# Patient Record
Sex: FEMALE | Race: WHITE | NOT HISPANIC OR LATINO | Employment: FULL TIME | URBAN - METROPOLITAN AREA
[De-identification: names, ages, dates, MRNs, and addresses within clinical notes are randomized per-mention and may not be internally consistent; named-entity substitution may affect disease eponyms.]

---

## 2021-11-13 ENCOUNTER — HOSPITAL ENCOUNTER (EMERGENCY)
Facility: MEDICAL CENTER | Age: 46
End: 2021-11-13
Attending: EMERGENCY MEDICINE
Payer: COMMERCIAL

## 2021-11-13 ENCOUNTER — APPOINTMENT (OUTPATIENT)
Dept: RADIOLOGY | Facility: MEDICAL CENTER | Age: 46
End: 2021-11-13
Attending: EMERGENCY MEDICINE
Payer: COMMERCIAL

## 2021-11-13 VITALS
HEART RATE: 63 BPM | DIASTOLIC BLOOD PRESSURE: 91 MMHG | WEIGHT: 213.63 LBS | RESPIRATION RATE: 16 BRPM | OXYGEN SATURATION: 95 % | BODY MASS INDEX: 34.33 KG/M2 | SYSTOLIC BLOOD PRESSURE: 169 MMHG | HEIGHT: 66 IN | TEMPERATURE: 97.2 F

## 2021-11-13 DIAGNOSIS — R20.0 LEFT FACIAL NUMBNESS: ICD-10-CM

## 2021-11-13 DIAGNOSIS — G43.809 OTHER MIGRAINE WITHOUT STATUS MIGRAINOSUS, NOT INTRACTABLE: ICD-10-CM

## 2021-11-13 LAB
ALBUMIN SERPL BCP-MCNC: 4.5 G/DL (ref 3.2–4.9)
ALBUMIN/GLOB SERPL: 1.5 G/DL
ALP SERPL-CCNC: 100 U/L (ref 30–99)
ALT SERPL-CCNC: 81 U/L (ref 2–50)
ANION GAP SERPL CALC-SCNC: 10 MMOL/L (ref 7–16)
AST SERPL-CCNC: 38 U/L (ref 12–45)
BASOPHILS # BLD AUTO: 0.7 % (ref 0–1.8)
BASOPHILS # BLD: 0.03 K/UL (ref 0–0.12)
BILIRUB SERPL-MCNC: 0.4 MG/DL (ref 0.1–1.5)
BUN SERPL-MCNC: 15 MG/DL (ref 8–22)
CALCIUM SERPL-MCNC: 9.8 MG/DL (ref 8.5–10.5)
CHLORIDE SERPL-SCNC: 107 MMOL/L (ref 96–112)
CO2 SERPL-SCNC: 26 MMOL/L (ref 20–33)
CREAT SERPL-MCNC: 0.67 MG/DL (ref 0.5–1.4)
EOSINOPHIL # BLD AUTO: 0.07 K/UL (ref 0–0.51)
EOSINOPHIL NFR BLD: 1.6 % (ref 0–6.9)
ERYTHROCYTE [DISTWIDTH] IN BLOOD BY AUTOMATED COUNT: 41.4 FL (ref 35.9–50)
GLOBULIN SER CALC-MCNC: 3 G/DL (ref 1.9–3.5)
GLUCOSE SERPL-MCNC: 78 MG/DL (ref 65–99)
HCT VFR BLD AUTO: 43.5 % (ref 37–47)
HGB BLD-MCNC: 14.4 G/DL (ref 12–16)
IMM GRANULOCYTES # BLD AUTO: 0.01 K/UL (ref 0–0.11)
IMM GRANULOCYTES NFR BLD AUTO: 0.2 % (ref 0–0.9)
LYMPHOCYTES # BLD AUTO: 1.76 K/UL (ref 1–4.8)
LYMPHOCYTES NFR BLD: 41.2 % (ref 22–41)
MCH RBC QN AUTO: 28.9 PG (ref 27–33)
MCHC RBC AUTO-ENTMCNC: 33.1 G/DL (ref 33.6–35)
MCV RBC AUTO: 87.3 FL (ref 81.4–97.8)
MONOCYTES # BLD AUTO: 0.35 K/UL (ref 0–0.85)
MONOCYTES NFR BLD AUTO: 8.2 % (ref 0–13.4)
NEUTROPHILS # BLD AUTO: 2.05 K/UL (ref 2–7.15)
NEUTROPHILS NFR BLD: 48.1 % (ref 44–72)
NRBC # BLD AUTO: 0 K/UL
NRBC BLD-RTO: 0 /100 WBC
PLATELET # BLD AUTO: 234 K/UL (ref 164–446)
PMV BLD AUTO: 8.7 FL (ref 9–12.9)
POTASSIUM SERPL-SCNC: 4.2 MMOL/L (ref 3.6–5.5)
PROT SERPL-MCNC: 7.5 G/DL (ref 6–8.2)
RBC # BLD AUTO: 4.98 M/UL (ref 4.2–5.4)
SODIUM SERPL-SCNC: 143 MMOL/L (ref 135–145)
WBC # BLD AUTO: 4.3 K/UL (ref 4.8–10.8)

## 2021-11-13 PROCEDURE — A9270 NON-COVERED ITEM OR SERVICE: HCPCS | Performed by: EMERGENCY MEDICINE

## 2021-11-13 PROCEDURE — 36415 COLL VENOUS BLD VENIPUNCTURE: CPT

## 2021-11-13 PROCEDURE — 700102 HCHG RX REV CODE 250 W/ 637 OVERRIDE(OP): Performed by: EMERGENCY MEDICINE

## 2021-11-13 PROCEDURE — 80053 COMPREHEN METABOLIC PANEL: CPT

## 2021-11-13 PROCEDURE — 99284 EMERGENCY DEPT VISIT MOD MDM: CPT

## 2021-11-13 PROCEDURE — 85025 COMPLETE CBC W/AUTO DIFF WBC: CPT

## 2021-11-13 PROCEDURE — 70551 MRI BRAIN STEM W/O DYE: CPT

## 2021-11-13 RX ORDER — TRAMADOL HYDROCHLORIDE 50 MG/1
50 TABLET ORAL EVERY 8 HOURS PRN
COMMUNITY

## 2021-11-13 RX ORDER — PROCHLORPERAZINE MALEATE 10 MG
10 TABLET ORAL ONCE
Status: COMPLETED | OUTPATIENT
Start: 2021-11-13 | End: 2021-11-13

## 2021-11-13 RX ORDER — LORAZEPAM 1 MG/1
1 TABLET ORAL ONCE
Status: COMPLETED | OUTPATIENT
Start: 2021-11-13 | End: 2021-11-13

## 2021-11-13 RX ORDER — SUMATRIPTAN 50 MG/1
50 TABLET, FILM COATED ORAL
COMMUNITY

## 2021-11-13 RX ORDER — DIPHENHYDRAMINE HCL 25 MG
25 TABLET ORAL ONCE
Status: COMPLETED | OUTPATIENT
Start: 2021-11-13 | End: 2021-11-13

## 2021-11-13 RX ADMIN — PROCHLORPERAZINE MALEATE 10 MG: 10 TABLET ORAL at 12:55

## 2021-11-13 RX ADMIN — DIPHENHYDRAMINE HYDROCHLORIDE 25 MG: 25 TABLET ORAL at 12:54

## 2021-11-13 RX ADMIN — LORAZEPAM 1 MG: 1 TABLET ORAL at 14:22

## 2021-11-13 ASSESSMENT — ENCOUNTER SYMPTOMS
BLURRED VISION: 1
FEVER: 0
SENSORY CHANGE: 1
NAUSEA: 0
WEAKNESS: 0
VOMITING: 0
TINGLING: 1
FALLS: 0
CHILLS: 0
NECK PAIN: 0
HEADACHES: 1

## 2021-11-13 NOTE — ED PROVIDER NOTES
ED Provider Note    Scribed for Floyd Avalos M.D. by Brandie English. 11/13/2021, 12:13 PM.    Primary care provider: PCP, Pt states none  Means of arrival: Walk in  History obtained from: Patient  History limited by: None    CHIEF COMPLAINT  Chief Complaint   Patient presents with   • Headache   • Tingling       HPI  Marlana Marie Behm is a 46 y.o. female, with a history of migraines, who presents to the Emergency Department for left sided headache. Onset was this morning upon waking up. She notes she felt at her baseline yesterday. She describes the left side of her face and head feels numb/tingling in quality, similar to a visit after a dentist appointment. Patient also states her daughter noted her left eyebrow was drooping this morning. She denies any proceeding trauma, injuries, or falls. She is concerned because her migraines in the past are usually right sided. However, her headache today is similar in quality to her previous right sided headaches. Patient has taken Excedrin and Imitrex this morning without any relief. She has taken Tramadol this morning as well with some mild relief. She denies any associated nausea, vomiting, fever, chills, neck trauma, extremity numbness, tingling, or weakness. No alleviating or exacerbating factors were identified.  Patient denies smoking cigarettes or any alcohol use. She is from [place] and visiting [place] for the weekend.  Denies any neck trauma or neck pain.  No chiropractic manipulation.  No other numbness tingling or weakness.  No fevers or chills or chest pain.    REVIEW OF SYSTEMS  Review of Systems   Constitutional: Negative for chills and fever.   Eyes: Positive for blurred vision.   Gastrointestinal: Negative for nausea and vomiting.   Musculoskeletal: Negative for falls and neck pain.   Neurological: Positive for tingling (left face, no extremity tingling), sensory change (left facial numbness) and headaches. Negative for weakness.   All other systems  "reviewed and are negative.    PAST MEDICAL HISTORY   has a past medical history of Migraines.    SURGICAL HISTORY   has a past surgical history that includes hysterectomy, total abdominal.    SOCIAL HISTORY  Social History     Tobacco Use   • Smoking status: Never Smoker   • Smokeless tobacco: None    Substance Use Topics   • Alcohol use: Not Currently   • Drug use: Not Currently      Social History     Substance and Sexual Activity   Drug Use Not Currently       FAMILY HISTORY  History reviewed. No pertinent family history.    CURRENT MEDICATIONS  Home Medications     Reviewed by Julia Mckeon R.N. (Registered Nurse) on 11/13/21 at 1143  Med List Status: Partial   Medication Last Dose Status   SUMAtriptan (IMITREX) 50 MG Tab  Active   traMADol (ULTRAM) 50 MG Tab  Active                ALLERGIES  No Known Allergies    PHYSICAL EXAM  VITAL SIGNS: BP (!) 188/111   Pulse 62   Temp 36.1 °C (96.9 °F) (Temporal)   Resp 12   Ht 1.676 m (5' 6\")   Wt 96.9 kg (213 lb 10 oz)   SpO2 98%   BMI 34.48 kg/m²   Vitals reviewed.  Constitutional: Well developed, Well nourished, No acute distress, Non-toxic appearance.   HENT: Normocephalic, Atraumatic, Bilateral external ears normal, Oropharynx moist, No oral exudates, Nose normal.   Eyes: PERRL, EOMI, Conjunctiva normal, No discharge.   Neck: Normal range of motion, No tenderness, Supple, No stridor.   Cardiovascular: Normal heart rate, Normal rhythm, No murmurs, No rubs, No gallops.   Thorax & Lungs: Normal breath sounds, No respiratory distress, No wheezing, No chest tenderness.   Abdomen: Bowel sounds normal, Soft, No tenderness  Skin: Warm, Dry, No erythema, No rash.   Back: No tenderness, No CVA tenderness.   Musculoskeletal: Good range of motion in all major joints. No edema. No tenderness to palpation or major deformities noted.   Neurologic: Alert, Normal motor function, Subjective left sided facial numbness. No focal deficits noted.  Cranial nerves II through XII " are intact.  She has subjective numbness on left side but no objective numbness she can actually feel.  There is no drooping of the eyebrow or of the face.  No pronator drift.  Normal strength sensation both upper extremities normal finger-to-nose.  Psychiatric: Affect normal    LABS  Results for orders placed or performed during the hospital encounter of 11/13/21   CBC WITH DIFFERENTIAL   Result Value Ref Range    WBC 4.3 (L) 4.8 - 10.8 K/uL    RBC 4.98 4.20 - 5.40 M/uL    Hemoglobin 14.4 12.0 - 16.0 g/dL    Hematocrit 43.5 37.0 - 47.0 %    MCV 87.3 81.4 - 97.8 fL    MCH 28.9 27.0 - 33.0 pg    MCHC 33.1 (L) 33.6 - 35.0 g/dL    RDW 41.4 35.9 - 50.0 fL    Platelet Count 234 164 - 446 K/uL    MPV 8.7 (L) 9.0 - 12.9 fL    Neutrophils-Polys 48.10 44.00 - 72.00 %    Lymphocytes 41.20 (H) 22.00 - 41.00 %    Monocytes 8.20 0.00 - 13.40 %    Eosinophils 1.60 0.00 - 6.90 %    Basophils 0.70 0.00 - 1.80 %    Immature Granulocytes 0.20 0.00 - 0.90 %    Nucleated RBC 0.00 /100 WBC    Neutrophils (Absolute) 2.05 2.00 - 7.15 K/uL    Lymphs (Absolute) 1.76 1.00 - 4.80 K/uL    Monos (Absolute) 0.35 0.00 - 0.85 K/uL    Eos (Absolute) 0.07 0.00 - 0.51 K/uL    Baso (Absolute) 0.03 0.00 - 0.12 K/uL    Immature Granulocytes (abs) 0.01 0.00 - 0.11 K/uL    NRBC (Absolute) 0.00 K/uL   COMP METABOLIC PANEL   Result Value Ref Range    Sodium 143 135 - 145 mmol/L    Potassium 4.2 3.6 - 5.5 mmol/L    Chloride 107 96 - 112 mmol/L    Co2 26 20 - 33 mmol/L    Anion Gap 10.0 7.0 - 16.0    Glucose 78 65 - 99 mg/dL    Bun 15 8 - 22 mg/dL    Creatinine 0.67 0.50 - 1.40 mg/dL    Calcium 9.8 8.5 - 10.5 mg/dL    AST(SGOT) 38 12 - 45 U/L    ALT(SGPT) 81 (H) 2 - 50 U/L    Alkaline Phosphatase 100 (H) 30 - 99 U/L    Total Bilirubin 0.4 0.1 - 1.5 mg/dL    Albumin 4.5 3.2 - 4.9 g/dL    Total Protein 7.5 6.0 - 8.2 g/dL    Globulin 3.0 1.9 - 3.5 g/dL    A-G Ratio 1.5 g/dL   ESTIMATED GFR   Result Value Ref Range    GFR If African American >60 >60 mL/min/1.73 m  2    GFR If Non African American >60 >60 mL/min/1.73 m 2        All labs reviewed by me.    RADIOLOGY  MR-BRAIN-W/O   Final Result      1.  MRI OF THE BRAIN WITHOUT CONTRAST WITHIN NORMAL LIMITS. NO EVIDENCE OF ACUTE INFARCTION, HEMORRHAGE, OR MASS LESION. NO EVIDENCE OF DEMYELINATING DISEASE.   2.  CONCERNING THE GIVEN HISTORY OF LEFT-SIDED FACIAL DROOP, THIS IMAGING STUDY DOES NOT EXCLUDE BELL'S PALSY. IF FURTHER IMAGING IS REQUIRED, A THIN SECTION GADOLINIUM CONTRAST ENHANCED IAC PROTOCOL WOULD BE BEST SUITED FOR IDENTIFYING BELL'S PALSY.        The radiologist's interpretation of all radiological studies have been reviewed by me.    COURSE & MEDICAL DECISION MAKING  Pertinent Labs & Imaging studies reviewed. (See chart for details)    12:13 PM Patient seen and examined at bedside. The patient presents with headache, and the differential diagnosis includes but is not limited to Bell's palsy, complicated migraine, stroke. Discussed plan of care with the patient. I informed her I will speak with neurology. She will be treated with medications for her symptoms. She is understanding and agreeable to plan. Patient will be treated with Benadryl 25 mg tablet, Compazine 10 mg tablet for her symptoms.      12:26 PM - Paged neurology.     12:27 PM - I discussed the patient's case and the above findings with Dr. Rodriguez who felt this was likely a complex migraine.  Cannot exclude stroke without an MRI.  Bell's palsy remains a differential diagnosis but she does not have symptoms other than subjective numbness.  Dr. Rodriguez (neurology)  recommended an MRI brain and to see if her blood pressure improves with medications.Ordered for MR Brain without to evaluate.  Differential diagnosis includes stroke, Bell's palsy, complicated migraine.    1:46 PM - Patient will be treated with Ativan 1 mg for her symptoms.  For anxiolysis for the MRI.    4:22 PM - Patient was reevaluated at bedside. She is resting comfortably in bed feeling  mildly improved. Discussed MRI results with the patient and informed them that they were reassuring. I will reevaluate her blood pressure at a later time.      Patient's blood pressures been intermittently high.  Is come down to 150/90.  Her headache was initially 7/10.  Repeat assessment is down about 2 out of 10 she feels much better.  She has left-sided facial numbness.  This could be an early Bell's.  At this point or not and I treat her empirically.  She will return if she develops worsening symptoms, weakness, droopy face, inability to raise her eyebrow or numbness.  She also return for stroke symptoms.  I have considered other pathology.  Doubt was a cervical dissection without any trauma or neck pain and no stroke.  He does not have a Rebecca syndrome.  Her pupils are reactive.  The patient had intermittent high blood pressure I do not think he is having hypertensive urgency or emergency.  Patient does not have a hemorrhage.    5:50 PM - Patient was reevaluated at bedside. Her blood pressure has improved. The patient will return for new or worsening symptoms and is stable at the time of discharge. Return to emergency department for continued or worsening numbness, difficulty blinking her eye or raising her left eyebrow.  Return for any other neurologic symptoms or complaints.  If you develop worsening numbness you may need to start medications for Bell's palsy.  These need to be started within 72 hours of onset of symptoms. Have your blood pressure rechecked within a week.  It was elevated.  Follow-up with your doctor. Patient verbalizes understanding and agreement to this plan of care.      At this point the patient looks well to be discharged with return precautions follow-up with your doctor.  Whatsoever blood pressure recheck 1 week.  Return for worsening symptoms or other concerns.  Questions are answered, she is agreeable to plan and discharged in good condition.    The patient is referred to a primary  physician for blood pressure management, diabetic screening, and for all other preventative health concerns.    DISPOSITION:  Patient will be discharged home in stable condition.    FOLLOW UP:  your        Follow up with your PCP.     FINAL IMPRESSION  1. Other migraine without status migrainosus, not intractable    2. Left facial numbness          Brandie GALEANO (Scribe), am scribing for, and in the presence of, Floyd Avalos M.D..    Electronically signed by: Brandie English (Scribe), 11/13/2021    IFloyd M.D. personally performed the services described in this documentation, as scribed by Brandie English in my presence, and it is both accurate and complete.    C    The note accurately reflects work and decisions made by me.  Floyd Avalos M.D.  11/13/2021  9:27 PM

## 2021-11-13 NOTE — ED TRIAGE NOTES
Pt comes in complaining of a left sided HA. Pt also concerned that her left eyebrow is lower then the R. Pt stating she woke up with the headache. Hx of migraines.

## 2021-11-14 NOTE — ED NOTES
Discharge instructions given to pt including follow up with pcp or returning if no improvement of symptoms or to return if worse. Questions answered by RN. Denies any new complaints. Discharged w/stable vitals and able to ambulate to the lobby w/steady gait..

## 2021-11-14 NOTE — DISCHARGE INSTRUCTIONS
Return to emerge department for continued or worsening numbness, difficulty blinking her eye or raising her left eyebrow.  Return for any other neurologic symptoms or complaints.  If you develop worsening numbness you may need to start medications for Bell's palsy.  These need to be started within 72 hours of onset of symptoms.  Have your blood pressure rechecked within a week.  It was elevated.  Follow-up with your doctor.

## 2024-02-05 ENCOUNTER — HOSPITAL ENCOUNTER (OUTPATIENT)
Dept: RADIOLOGY | Facility: MEDICAL CENTER | Age: 49
End: 2024-02-05
Payer: COMMERCIAL

## 2024-02-07 ENCOUNTER — HOSPITAL ENCOUNTER (OUTPATIENT)
Dept: RADIOLOGY | Facility: MEDICAL CENTER | Age: 49
End: 2024-02-07
Attending: NURSE PRACTITIONER
Payer: COMMERCIAL

## 2024-02-07 DIAGNOSIS — Z12.39 SCREENING BREAST EXAMINATION: ICD-10-CM

## 2024-02-07 DIAGNOSIS — Z12.31 ENCOUNTER FOR SCREENING MAMMOGRAM FOR MALIGNANT NEOPLASM OF BREAST: ICD-10-CM

## 2024-02-07 PROCEDURE — 77067 SCR MAMMO BI INCL CAD: CPT

## 2024-02-20 ENCOUNTER — HOSPITAL ENCOUNTER (OUTPATIENT)
Dept: LAB | Facility: MEDICAL CENTER | Age: 49
End: 2024-02-20
Attending: NURSE PRACTITIONER
Payer: COMMERCIAL

## 2024-02-20 LAB
25(OH)D3 SERPL-MCNC: 26 NG/ML (ref 30–100)
ALBUMIN SERPL BCP-MCNC: 4 G/DL (ref 3.2–4.9)
ALBUMIN/GLOB SERPL: 1.1 G/DL
ALP SERPL-CCNC: 91 U/L (ref 30–99)
ALT SERPL-CCNC: 24 U/L (ref 2–50)
ANION GAP SERPL CALC-SCNC: 12 MMOL/L (ref 7–16)
AST SERPL-CCNC: 20 U/L (ref 12–45)
BASOPHILS # BLD AUTO: 0.4 % (ref 0–1.8)
BASOPHILS # BLD: 0.02 K/UL (ref 0–0.12)
BILIRUB SERPL-MCNC: 0.3 MG/DL (ref 0.1–1.5)
BUN SERPL-MCNC: 12 MG/DL (ref 8–22)
CALCIUM ALBUM COR SERPL-MCNC: 10.1 MG/DL (ref 8.5–10.5)
CALCIUM SERPL-MCNC: 10.1 MG/DL (ref 8.5–10.5)
CHLORIDE SERPL-SCNC: 103 MMOL/L (ref 96–112)
CHOLEST SERPL-MCNC: 198 MG/DL (ref 100–199)
CO2 SERPL-SCNC: 27 MMOL/L (ref 20–33)
CREAT SERPL-MCNC: 0.76 MG/DL (ref 0.5–1.4)
EOSINOPHIL # BLD AUTO: 0.09 K/UL (ref 0–0.51)
EOSINOPHIL NFR BLD: 1.7 % (ref 0–6.9)
ERYTHROCYTE [DISTWIDTH] IN BLOOD BY AUTOMATED COUNT: 40.1 FL (ref 35.9–50)
FASTING STATUS PATIENT QL REPORTED: NORMAL
GFR SERPLBLD CREATININE-BSD FMLA CKD-EPI: 97 ML/MIN/1.73 M 2
GLOBULIN SER CALC-MCNC: 3.5 G/DL (ref 1.9–3.5)
GLUCOSE SERPL-MCNC: 92 MG/DL (ref 65–99)
HCT VFR BLD AUTO: 42.2 % (ref 37–47)
HDLC SERPL-MCNC: 49 MG/DL
HGB BLD-MCNC: 13.7 G/DL (ref 12–16)
IMM GRANULOCYTES # BLD AUTO: 0.01 K/UL (ref 0–0.11)
IMM GRANULOCYTES NFR BLD AUTO: 0.2 % (ref 0–0.9)
LDLC SERPL CALC-MCNC: 129 MG/DL
LYMPHOCYTES # BLD AUTO: 1.92 K/UL (ref 1–4.8)
LYMPHOCYTES NFR BLD: 35.8 % (ref 22–41)
MCH RBC QN AUTO: 28.9 PG (ref 27–33)
MCHC RBC AUTO-ENTMCNC: 32.5 G/DL (ref 32.2–35.5)
MCV RBC AUTO: 89 FL (ref 81.4–97.8)
MONOCYTES # BLD AUTO: 0.28 K/UL (ref 0–0.85)
MONOCYTES NFR BLD AUTO: 5.2 % (ref 0–13.4)
NEUTROPHILS # BLD AUTO: 3.04 K/UL (ref 1.82–7.42)
NEUTROPHILS NFR BLD: 56.7 % (ref 44–72)
NRBC # BLD AUTO: 0 K/UL
NRBC BLD-RTO: 0 /100 WBC (ref 0–0.2)
PLATELET # BLD AUTO: 312 K/UL (ref 164–446)
PMV BLD AUTO: 9 FL (ref 9–12.9)
POTASSIUM SERPL-SCNC: 4.5 MMOL/L (ref 3.6–5.5)
PROT SERPL-MCNC: 7.5 G/DL (ref 6–8.2)
RBC # BLD AUTO: 4.74 M/UL (ref 4.2–5.4)
SODIUM SERPL-SCNC: 142 MMOL/L (ref 135–145)
T4 FREE SERPL-MCNC: 1.05 NG/DL (ref 0.93–1.7)
TRIGL SERPL-MCNC: 101 MG/DL (ref 0–149)
TSH SERPL DL<=0.005 MIU/L-ACNC: 1.14 UIU/ML (ref 0.38–5.33)
WBC # BLD AUTO: 5.4 K/UL (ref 4.8–10.8)

## 2024-02-20 PROCEDURE — 80053 COMPREHEN METABOLIC PANEL: CPT

## 2024-02-20 PROCEDURE — 82306 VITAMIN D 25 HYDROXY: CPT

## 2024-02-20 PROCEDURE — 36415 COLL VENOUS BLD VENIPUNCTURE: CPT

## 2024-02-20 PROCEDURE — 85025 COMPLETE CBC W/AUTO DIFF WBC: CPT

## 2024-02-20 PROCEDURE — 84439 ASSAY OF FREE THYROXINE: CPT

## 2024-02-20 PROCEDURE — 84443 ASSAY THYROID STIM HORMONE: CPT

## 2024-02-20 PROCEDURE — 80061 LIPID PANEL: CPT

## 2024-04-06 ENCOUNTER — APPOINTMENT (OUTPATIENT)
Dept: RADIOLOGY | Facility: IMAGING CENTER | Age: 49
End: 2024-04-06
Attending: SURGERY
Payer: COMMERCIAL

## 2024-04-06 DIAGNOSIS — Z01.818 PREOP EXAMINATION: ICD-10-CM

## 2024-04-06 DIAGNOSIS — Z87.891 HISTORY OF TOBACCO USE: ICD-10-CM

## 2024-04-06 DIAGNOSIS — E66.9 CLASS 1 OBESITY IN ADULT, UNSPECIFIED BMI, UNSPECIFIED OBESITY TYPE, UNSPECIFIED WHETHER SERIOUS COMORBIDITY PRESENT: ICD-10-CM

## 2024-04-06 DIAGNOSIS — E13.69 OTHER SPECIFIED DIABETES MELLITUS WITH OTHER SPECIFIED COMPLICATION, UNSPECIFIED WHETHER LONG TERM INSULIN USE (HCC): ICD-10-CM

## 2024-04-06 PROCEDURE — 71048 X-RAY EXAM CHEST 4+ VIEWS: CPT | Mod: TC | Performed by: FAMILY MEDICINE

## 2024-05-02 ENCOUNTER — APPOINTMENT (OUTPATIENT)
Dept: URGENT CARE | Facility: PHYSICIAN GROUP | Age: 49
End: 2024-05-02
Payer: COMMERCIAL

## 2024-05-31 ENCOUNTER — HOSPITAL ENCOUNTER (OUTPATIENT)
Dept: CARDIOLOGY | Facility: MEDICAL CENTER | Age: 49
End: 2024-05-31
Attending: SURGERY
Payer: COMMERCIAL

## 2024-05-31 ENCOUNTER — HOSPITAL ENCOUNTER (OUTPATIENT)
Dept: LAB | Facility: MEDICAL CENTER | Age: 49
End: 2024-05-31
Attending: NURSE PRACTITIONER
Payer: COMMERCIAL

## 2024-05-31 LAB
EKG IMPRESSION: NORMAL
EST. AVERAGE GLUCOSE BLD GHB EST-MCNC: 114 MG/DL
HBA1C MFR BLD: 5.6 % (ref 4–5.6)
TSH SERPL DL<=0.005 MIU/L-ACNC: 1.56 UIU/ML (ref 0.38–5.33)

## 2024-05-31 PROCEDURE — 93005 ELECTROCARDIOGRAM TRACING: CPT | Performed by: SURGERY

## 2024-05-31 PROCEDURE — 84443 ASSAY THYROID STIM HORMONE: CPT

## 2024-05-31 PROCEDURE — 93010 ELECTROCARDIOGRAM REPORT: CPT | Performed by: INTERNAL MEDICINE

## 2024-05-31 PROCEDURE — 83036 HEMOGLOBIN GLYCOSYLATED A1C: CPT

## 2024-05-31 PROCEDURE — 36415 COLL VENOUS BLD VENIPUNCTURE: CPT

## 2024-07-19 ENCOUNTER — APPOINTMENT (OUTPATIENT)
Dept: URGENT CARE | Facility: PHYSICIAN GROUP | Age: 49
End: 2024-07-19
Payer: COMMERCIAL

## 2024-08-02 ENCOUNTER — APPOINTMENT (OUTPATIENT)
Dept: ADMISSIONS | Facility: MEDICAL CENTER | Age: 49
DRG: 621 | End: 2024-08-02
Attending: SURGERY
Payer: COMMERCIAL

## 2024-08-06 ENCOUNTER — PRE-ADMISSION TESTING (OUTPATIENT)
Dept: ADMISSIONS | Facility: MEDICAL CENTER | Age: 49
DRG: 621 | End: 2024-08-06
Attending: SURGERY
Payer: COMMERCIAL

## 2024-08-06 RX ORDER — LOSARTAN POTASSIUM AND HYDROCHLOROTHIAZIDE 12.5; 5 MG/1; MG/1
1 TABLET ORAL DAILY
COMMUNITY
Start: 2024-02-12

## 2024-08-06 RX ORDER — CYCLOBENZAPRINE HCL 5 MG
5-10 TABLET ORAL 3 TIMES DAILY PRN
Status: ON HOLD | COMMUNITY
Start: 2024-04-02 | End: 2024-08-10

## 2024-08-06 RX ORDER — ROPINIROLE 0.25 MG/1
0.25 TABLET, FILM COATED ORAL 3 TIMES DAILY PRN
COMMUNITY

## 2024-08-06 NOTE — OR NURSING
RN pre admit tele appointment complete.  Medication and fasting instructions given per anesthesia protocol.  Instructed to hold all vitamin and herbal supplements for 7 days and hold all NSAIDs for 5 days, hold losartan for 24 hours  prior to surgery unless otherwise instructed by physician.  Patient stated understanding of all instructions and had no further questions.

## 2024-08-08 ENCOUNTER — PRE-ADMISSION TESTING (OUTPATIENT)
Dept: ADMISSIONS | Facility: MEDICAL CENTER | Age: 49
DRG: 621 | End: 2024-08-08
Attending: SURGERY
Payer: COMMERCIAL

## 2024-08-08 DIAGNOSIS — Z01.812 PRE-OPERATIVE LABORATORY EXAMINATION: ICD-10-CM

## 2024-08-08 DIAGNOSIS — Z01.810 PRE-OPERATIVE CARDIOVASCULAR EXAMINATION: ICD-10-CM

## 2024-08-08 LAB
ALBUMIN SERPL BCP-MCNC: 4.3 G/DL (ref 3.2–4.9)
ALBUMIN/GLOB SERPL: 1.3 G/DL
ALP SERPL-CCNC: 96 U/L (ref 30–99)
ALT SERPL-CCNC: 48 U/L (ref 2–50)
ANION GAP SERPL CALC-SCNC: 13 MMOL/L (ref 7–16)
AST SERPL-CCNC: 28 U/L (ref 12–45)
BASOPHILS # BLD AUTO: 0.4 % (ref 0–1.8)
BASOPHILS # BLD: 0.02 K/UL (ref 0–0.12)
BILIRUB SERPL-MCNC: 0.5 MG/DL (ref 0.1–1.5)
BUN SERPL-MCNC: 20 MG/DL (ref 8–22)
CALCIUM ALBUM COR SERPL-MCNC: 9.3 MG/DL (ref 8.5–10.5)
CALCIUM SERPL-MCNC: 9.5 MG/DL (ref 8.5–10.5)
CHLORIDE SERPL-SCNC: 98 MMOL/L (ref 96–112)
CO2 SERPL-SCNC: 26 MMOL/L (ref 20–33)
CREAT SERPL-MCNC: 0.59 MG/DL (ref 0.5–1.4)
EKG IMPRESSION: NORMAL
EOSINOPHIL # BLD AUTO: 0.05 K/UL (ref 0–0.51)
EOSINOPHIL NFR BLD: 1 % (ref 0–6.9)
ERYTHROCYTE [DISTWIDTH] IN BLOOD BY AUTOMATED COUNT: 40.7 FL (ref 35.9–50)
GFR SERPLBLD CREATININE-BSD FMLA CKD-EPI: 111 ML/MIN/1.73 M 2
GLOBULIN SER CALC-MCNC: 3.3 G/DL (ref 1.9–3.5)
GLUCOSE SERPL-MCNC: 81 MG/DL (ref 65–99)
HCT VFR BLD AUTO: 41.8 % (ref 37–47)
HGB BLD-MCNC: 13.7 G/DL (ref 12–16)
IMM GRANULOCYTES # BLD AUTO: 0.01 K/UL (ref 0–0.11)
IMM GRANULOCYTES NFR BLD AUTO: 0.2 % (ref 0–0.9)
INR PPP: 1.06 (ref 0.87–1.13)
LYMPHOCYTES # BLD AUTO: 1.82 K/UL (ref 1–4.8)
LYMPHOCYTES NFR BLD: 37.1 % (ref 22–41)
MCH RBC QN AUTO: 28.9 PG (ref 27–33)
MCHC RBC AUTO-ENTMCNC: 32.8 G/DL (ref 32.2–35.5)
MCV RBC AUTO: 88.2 FL (ref 81.4–97.8)
MONOCYTES # BLD AUTO: 0.4 K/UL (ref 0–0.85)
MONOCYTES NFR BLD AUTO: 8.1 % (ref 0–13.4)
NEUTROPHILS # BLD AUTO: 2.61 K/UL (ref 1.82–7.42)
NEUTROPHILS NFR BLD: 53.2 % (ref 44–72)
NRBC # BLD AUTO: 0 K/UL
NRBC BLD-RTO: 0 /100 WBC (ref 0–0.2)
PLATELET # BLD AUTO: 290 K/UL (ref 164–446)
PMV BLD AUTO: 8.7 FL (ref 9–12.9)
POTASSIUM SERPL-SCNC: 3.7 MMOL/L (ref 3.6–5.5)
PROT SERPL-MCNC: 7.6 G/DL (ref 6–8.2)
PROTHROMBIN TIME: 13.9 SEC (ref 12–14.6)
RBC # BLD AUTO: 4.74 M/UL (ref 4.2–5.4)
SODIUM SERPL-SCNC: 137 MMOL/L (ref 135–145)
WBC # BLD AUTO: 4.9 K/UL (ref 4.8–10.8)

## 2024-08-08 PROCEDURE — 85025 COMPLETE CBC W/AUTO DIFF WBC: CPT

## 2024-08-08 PROCEDURE — 93010 ELECTROCARDIOGRAM REPORT: CPT | Performed by: INTERNAL MEDICINE

## 2024-08-08 PROCEDURE — 36415 COLL VENOUS BLD VENIPUNCTURE: CPT

## 2024-08-08 PROCEDURE — 93005 ELECTROCARDIOGRAM TRACING: CPT

## 2024-08-08 PROCEDURE — 85610 PROTHROMBIN TIME: CPT

## 2024-08-08 PROCEDURE — 80053 COMPREHEN METABOLIC PANEL: CPT

## 2024-08-09 ENCOUNTER — HOSPITAL ENCOUNTER (INPATIENT)
Facility: MEDICAL CENTER | Age: 49
LOS: 1 days | DRG: 621 | End: 2024-08-10
Attending: SURGERY | Admitting: SURGERY
Payer: COMMERCIAL

## 2024-08-09 ENCOUNTER — ANESTHESIA EVENT (OUTPATIENT)
Dept: SURGERY | Facility: MEDICAL CENTER | Age: 49
DRG: 621 | End: 2024-08-09
Payer: COMMERCIAL

## 2024-08-09 ENCOUNTER — ANESTHESIA (OUTPATIENT)
Dept: SURGERY | Facility: MEDICAL CENTER | Age: 49
DRG: 621 | End: 2024-08-09
Payer: COMMERCIAL

## 2024-08-09 DIAGNOSIS — R10.10 PAIN OF UPPER ABDOMEN: ICD-10-CM

## 2024-08-09 DIAGNOSIS — R11.2 NAUSEA AND VOMITING, UNSPECIFIED VOMITING TYPE: ICD-10-CM

## 2024-08-09 PROCEDURE — A9270 NON-COVERED ITEM OR SERVICE: HCPCS | Performed by: SURGERY

## 2024-08-09 PROCEDURE — 160031 HCHG SURGERY MINUTES - 1ST 30 MINS LEVEL 5: Performed by: SURGERY

## 2024-08-09 PROCEDURE — 160002 HCHG RECOVERY MINUTES (STAT): Performed by: SURGERY

## 2024-08-09 PROCEDURE — 0BQT4ZZ REPAIR DIAPHRAGM, PERCUTANEOUS ENDOSCOPIC APPROACH: ICD-10-PCS | Performed by: SURGERY

## 2024-08-09 PROCEDURE — 700102 HCHG RX REV CODE 250 W/ 637 OVERRIDE(OP): Performed by: SURGERY

## 2024-08-09 PROCEDURE — 700111 HCHG RX REV CODE 636 W/ 250 OVERRIDE (IP): Mod: JZ | Performed by: ANESTHESIOLOGY

## 2024-08-09 PROCEDURE — A9270 NON-COVERED ITEM OR SERVICE: HCPCS | Performed by: ANESTHESIOLOGY

## 2024-08-09 PROCEDURE — 700105 HCHG RX REV CODE 258: Performed by: ANESTHESIOLOGY

## 2024-08-09 PROCEDURE — 160035 HCHG PACU - 1ST 60 MINS PHASE I: Performed by: SURGERY

## 2024-08-09 PROCEDURE — 770006 HCHG ROOM/CARE - MED/SURG/GYN SEMI*

## 2024-08-09 PROCEDURE — 160042 HCHG SURGERY MINUTES - EA ADDL 1 MIN LEVEL 5: Performed by: SURGERY

## 2024-08-09 PROCEDURE — 502714 HCHG ROBOTIC SURGERY SERVICES: Performed by: SURGERY

## 2024-08-09 PROCEDURE — 700101 HCHG RX REV CODE 250: Performed by: ANESTHESIOLOGY

## 2024-08-09 PROCEDURE — 700111 HCHG RX REV CODE 636 W/ 250 OVERRIDE (IP): Performed by: ANESTHESIOLOGY

## 2024-08-09 PROCEDURE — 160009 HCHG ANES TIME/MIN: Performed by: SURGERY

## 2024-08-09 PROCEDURE — 160048 HCHG OR STATISTICAL LEVEL 1-5: Performed by: SURGERY

## 2024-08-09 PROCEDURE — 700111 HCHG RX REV CODE 636 W/ 250 OVERRIDE (IP): Mod: JZ | Performed by: SURGERY

## 2024-08-09 PROCEDURE — 88307 TISSUE EXAM BY PATHOLOGIST: CPT

## 2024-08-09 PROCEDURE — 700101 HCHG RX REV CODE 250: Performed by: SURGERY

## 2024-08-09 PROCEDURE — 0DB64Z3 EXCISION OF STOMACH, PERCUTANEOUS ENDOSCOPIC APPROACH, VERTICAL: ICD-10-PCS | Performed by: SURGERY

## 2024-08-09 PROCEDURE — 700105 HCHG RX REV CODE 258: Performed by: SURGERY

## 2024-08-09 PROCEDURE — 8E0W4CZ ROBOTIC ASSISTED PROCEDURE OF TRUNK REGION, PERCUTANEOUS ENDOSCOPIC APPROACH: ICD-10-PCS | Performed by: SURGERY

## 2024-08-09 PROCEDURE — 700102 HCHG RX REV CODE 250 W/ 637 OVERRIDE(OP): Performed by: ANESTHESIOLOGY

## 2024-08-09 PROCEDURE — 88342 IMHCHEM/IMCYTCHM 1ST ANTB: CPT

## 2024-08-09 RX ORDER — HALOPERIDOL 5 MG/ML
1 INJECTION INTRAMUSCULAR
Status: DISCONTINUED | OUTPATIENT
Start: 2024-08-09 | End: 2024-08-09 | Stop reason: HOSPADM

## 2024-08-09 RX ORDER — ALBUTEROL SULFATE 5 MG/ML
2.5 SOLUTION RESPIRATORY (INHALATION)
Status: DISCONTINUED | OUTPATIENT
Start: 2024-08-09 | End: 2024-08-09 | Stop reason: HOSPADM

## 2024-08-09 RX ORDER — ONDANSETRON 2 MG/ML
4 INJECTION INTRAMUSCULAR; INTRAVENOUS EVERY 6 HOURS
Status: DISCONTINUED | OUTPATIENT
Start: 2024-08-09 | End: 2024-08-10 | Stop reason: HOSPADM

## 2024-08-09 RX ORDER — HYDROCHLOROTHIAZIDE 12.5 MG/1
12.5 TABLET ORAL
Status: DISCONTINUED | OUTPATIENT
Start: 2024-08-10 | End: 2024-08-10 | Stop reason: HOSPADM

## 2024-08-09 RX ORDER — DIPHENHYDRAMINE HYDROCHLORIDE 50 MG/ML
12.5 INJECTION INTRAMUSCULAR; INTRAVENOUS
Status: DISCONTINUED | OUTPATIENT
Start: 2024-08-09 | End: 2024-08-09 | Stop reason: HOSPADM

## 2024-08-09 RX ORDER — SODIUM CHLORIDE, SODIUM LACTATE, POTASSIUM CHLORIDE, CALCIUM CHLORIDE 600; 310; 30; 20 MG/100ML; MG/100ML; MG/100ML; MG/100ML
INJECTION, SOLUTION INTRAVENOUS CONTINUOUS
Status: ACTIVE | OUTPATIENT
Start: 2024-08-09 | End: 2024-08-09

## 2024-08-09 RX ORDER — OXYCODONE HCL 5 MG/5 ML
10 SOLUTION, ORAL ORAL
Status: DISCONTINUED | OUTPATIENT
Start: 2024-08-09 | End: 2024-08-10 | Stop reason: HOSPADM

## 2024-08-09 RX ORDER — ONDANSETRON 2 MG/ML
4 INJECTION INTRAMUSCULAR; INTRAVENOUS
Status: COMPLETED | OUTPATIENT
Start: 2024-08-09 | End: 2024-08-09

## 2024-08-09 RX ORDER — KETOROLAC TROMETHAMINE 15 MG/ML
15 INJECTION, SOLUTION INTRAMUSCULAR; INTRAVENOUS EVERY 6 HOURS
Status: DISCONTINUED | OUTPATIENT
Start: 2024-08-09 | End: 2024-08-10 | Stop reason: HOSPADM

## 2024-08-09 RX ORDER — HEPARIN SODIUM 5000 [USP'U]/ML
5000 INJECTION, SOLUTION INTRAVENOUS; SUBCUTANEOUS EVERY 8 HOURS
Status: DISCONTINUED | OUTPATIENT
Start: 2024-08-10 | End: 2024-08-10 | Stop reason: HOSPADM

## 2024-08-09 RX ORDER — HEPARIN SODIUM 5000 [USP'U]/ML
5000 INJECTION, SOLUTION INTRAVENOUS; SUBCUTANEOUS
Status: COMPLETED | OUTPATIENT
Start: 2024-08-09 | End: 2024-08-09

## 2024-08-09 RX ORDER — METOCLOPRAMIDE HYDROCHLORIDE 5 MG/ML
10 INJECTION INTRAMUSCULAR; INTRAVENOUS EVERY 6 HOURS PRN
Status: DISCONTINUED | OUTPATIENT
Start: 2024-08-09 | End: 2024-08-10 | Stop reason: HOSPADM

## 2024-08-09 RX ORDER — ACETAMINOPHEN 500 MG
1000 TABLET ORAL EVERY 6 HOURS PRN
Status: DISCONTINUED | OUTPATIENT
Start: 2024-08-14 | End: 2024-08-10 | Stop reason: HOSPADM

## 2024-08-09 RX ORDER — ACETAMINOPHEN 500 MG
1000 TABLET ORAL EVERY 6 HOURS PRN
COMMUNITY

## 2024-08-09 RX ORDER — HYDROMORPHONE HYDROCHLORIDE 1 MG/ML
0.2 INJECTION, SOLUTION INTRAMUSCULAR; INTRAVENOUS; SUBCUTANEOUS
Status: DISCONTINUED | OUTPATIENT
Start: 2024-08-09 | End: 2024-08-09 | Stop reason: HOSPADM

## 2024-08-09 RX ORDER — LIDOCAINE HYDROCHLORIDE 20 MG/ML
INJECTION, SOLUTION EPIDURAL; INFILTRATION; INTRACAUDAL; PERINEURAL PRN
Status: DISCONTINUED | OUTPATIENT
Start: 2024-08-09 | End: 2024-08-09 | Stop reason: SURG

## 2024-08-09 RX ORDER — PANTOPRAZOLE SODIUM 40 MG/10ML
40 INJECTION, POWDER, LYOPHILIZED, FOR SOLUTION INTRAVENOUS 2 TIMES DAILY
Status: DISCONTINUED | OUTPATIENT
Start: 2024-08-09 | End: 2024-08-10 | Stop reason: HOSPADM

## 2024-08-09 RX ORDER — CEFAZOLIN SODIUM 1 G/3ML
INJECTION, POWDER, FOR SOLUTION INTRAMUSCULAR; INTRAVENOUS PRN
Status: DISCONTINUED | OUTPATIENT
Start: 2024-08-09 | End: 2024-08-09 | Stop reason: SURG

## 2024-08-09 RX ORDER — LOSARTAN POTASSIUM 50 MG/1
50 TABLET ORAL
Status: DISCONTINUED | OUTPATIENT
Start: 2024-08-10 | End: 2024-08-10 | Stop reason: HOSPADM

## 2024-08-09 RX ORDER — HYDROMORPHONE HYDROCHLORIDE 2 MG/ML
INJECTION, SOLUTION INTRAMUSCULAR; INTRAVENOUS; SUBCUTANEOUS PRN
Status: DISCONTINUED | OUTPATIENT
Start: 2024-08-09 | End: 2024-08-09 | Stop reason: SURG

## 2024-08-09 RX ORDER — SODIUM CHLORIDE, SODIUM LACTATE, POTASSIUM CHLORIDE, CALCIUM CHLORIDE 600; 310; 30; 20 MG/100ML; MG/100ML; MG/100ML; MG/100ML
INJECTION, SOLUTION INTRAVENOUS
Status: DISCONTINUED | OUTPATIENT
Start: 2024-08-09 | End: 2024-08-09 | Stop reason: SURG

## 2024-08-09 RX ORDER — LIDOCAINE HYDROCHLORIDE 40 MG/ML
SOLUTION TOPICAL PRN
Status: DISCONTINUED | OUTPATIENT
Start: 2024-08-09 | End: 2024-08-09 | Stop reason: SURG

## 2024-08-09 RX ORDER — HYDROMORPHONE HYDROCHLORIDE 1 MG/ML
0.1 INJECTION, SOLUTION INTRAMUSCULAR; INTRAVENOUS; SUBCUTANEOUS
Status: DISCONTINUED | OUTPATIENT
Start: 2024-08-09 | End: 2024-08-09 | Stop reason: HOSPADM

## 2024-08-09 RX ORDER — SODIUM CHLORIDE, SODIUM LACTATE, POTASSIUM CHLORIDE, CALCIUM CHLORIDE 600; 310; 30; 20 MG/100ML; MG/100ML; MG/100ML; MG/100ML
INJECTION, SOLUTION INTRAVENOUS CONTINUOUS
Status: DISCONTINUED | OUTPATIENT
Start: 2024-08-09 | End: 2024-08-10 | Stop reason: HOSPADM

## 2024-08-09 RX ORDER — SCOLOPAMINE TRANSDERMAL SYSTEM 1 MG/1
1 PATCH, EXTENDED RELEASE TRANSDERMAL
Status: ON HOLD | COMMUNITY
End: 2024-08-10

## 2024-08-09 RX ORDER — HYDROMORPHONE HYDROCHLORIDE 1 MG/ML
0.5 INJECTION, SOLUTION INTRAMUSCULAR; INTRAVENOUS; SUBCUTANEOUS
Status: DISCONTINUED | OUTPATIENT
Start: 2024-08-09 | End: 2024-08-10 | Stop reason: HOSPADM

## 2024-08-09 RX ORDER — ROPINIROLE 0.25 MG/1
0.25 TABLET, FILM COATED ORAL 3 TIMES DAILY PRN
Status: DISCONTINUED | OUTPATIENT
Start: 2024-08-09 | End: 2024-08-10 | Stop reason: HOSPADM

## 2024-08-09 RX ORDER — LABETALOL HYDROCHLORIDE 5 MG/ML
10 INJECTION, SOLUTION INTRAVENOUS
Status: DISCONTINUED | OUTPATIENT
Start: 2024-08-09 | End: 2024-08-10 | Stop reason: HOSPADM

## 2024-08-09 RX ORDER — LOSARTAN POTASSIUM AND HYDROCHLOROTHIAZIDE 12.5; 5 MG/1; MG/1
1 TABLET ORAL DAILY
Status: DISCONTINUED | OUTPATIENT
Start: 2024-08-09 | End: 2024-08-09

## 2024-08-09 RX ORDER — DEXMEDETOMIDINE HYDROCHLORIDE 100 UG/ML
INJECTION, SOLUTION INTRAVENOUS PRN
Status: DISCONTINUED | OUTPATIENT
Start: 2024-08-09 | End: 2024-08-09 | Stop reason: SURG

## 2024-08-09 RX ORDER — SIMETHICONE 125 MG
125 TABLET,CHEWABLE ORAL 2 TIMES DAILY
Status: DISCONTINUED | OUTPATIENT
Start: 2024-08-09 | End: 2024-08-10 | Stop reason: HOSPADM

## 2024-08-09 RX ORDER — ONDANSETRON 2 MG/ML
INJECTION INTRAMUSCULAR; INTRAVENOUS PRN
Status: DISCONTINUED | OUTPATIENT
Start: 2024-08-09 | End: 2024-08-09 | Stop reason: SURG

## 2024-08-09 RX ORDER — POLYETHYLENE GLYCOL 3350 17 G/17G
1 POWDER, FOR SOLUTION ORAL DAILY
Status: DISCONTINUED | OUTPATIENT
Start: 2024-08-10 | End: 2024-08-10 | Stop reason: HOSPADM

## 2024-08-09 RX ORDER — PROMETHAZINE HYDROCHLORIDE 25 MG/1
25 SUPPOSITORY RECTAL EVERY 4 HOURS PRN
Status: DISCONTINUED | OUTPATIENT
Start: 2024-08-09 | End: 2024-08-10 | Stop reason: HOSPADM

## 2024-08-09 RX ORDER — OXYCODONE HCL 5 MG/5 ML
10 SOLUTION, ORAL ORAL
Status: COMPLETED | OUTPATIENT
Start: 2024-08-09 | End: 2024-08-09

## 2024-08-09 RX ORDER — EPHEDRINE SULFATE 50 MG/ML
5 INJECTION, SOLUTION INTRAVENOUS
Status: DISCONTINUED | OUTPATIENT
Start: 2024-08-09 | End: 2024-08-09 | Stop reason: HOSPADM

## 2024-08-09 RX ORDER — GABAPENTIN 250 MG/5ML
300 SOLUTION ORAL 3 TIMES DAILY
Status: DISCONTINUED | OUTPATIENT
Start: 2024-08-09 | End: 2024-08-10 | Stop reason: HOSPADM

## 2024-08-09 RX ORDER — DEXAMETHASONE SODIUM PHOSPHATE 4 MG/ML
4 INJECTION, SOLUTION INTRA-ARTICULAR; INTRALESIONAL; INTRAMUSCULAR; INTRAVENOUS; SOFT TISSUE EVERY 6 HOURS
Status: DISCONTINUED | OUTPATIENT
Start: 2024-08-09 | End: 2024-08-10 | Stop reason: HOSPADM

## 2024-08-09 RX ORDER — ACETAMINOPHEN 500 MG
1000 TABLET ORAL EVERY 6 HOURS
Status: DISCONTINUED | OUTPATIENT
Start: 2024-08-09 | End: 2024-08-10 | Stop reason: HOSPADM

## 2024-08-09 RX ORDER — OXYCODONE HCL 10 MG/1
10 TABLET, FILM COATED, EXTENDED RELEASE ORAL ONCE
Status: COMPLETED | OUTPATIENT
Start: 2024-08-09 | End: 2024-08-09

## 2024-08-09 RX ORDER — HYDRALAZINE HYDROCHLORIDE 20 MG/ML
5 INJECTION INTRAMUSCULAR; INTRAVENOUS
Status: DISCONTINUED | OUTPATIENT
Start: 2024-08-09 | End: 2024-08-09 | Stop reason: HOSPADM

## 2024-08-09 RX ORDER — HYDROMORPHONE HYDROCHLORIDE 1 MG/ML
0.4 INJECTION, SOLUTION INTRAMUSCULAR; INTRAVENOUS; SUBCUTANEOUS
Status: DISCONTINUED | OUTPATIENT
Start: 2024-08-09 | End: 2024-08-09 | Stop reason: HOSPADM

## 2024-08-09 RX ORDER — OXYCODONE HCL 5 MG/5 ML
5 SOLUTION, ORAL ORAL
Status: COMPLETED | OUTPATIENT
Start: 2024-08-09 | End: 2024-08-09

## 2024-08-09 RX ORDER — BUPIVACAINE HYDROCHLORIDE AND EPINEPHRINE 5; 5 MG/ML; UG/ML
INJECTION, SOLUTION EPIDURAL; INTRACAUDAL; PERINEURAL
Status: DISCONTINUED | OUTPATIENT
Start: 2024-08-09 | End: 2024-08-09 | Stop reason: HOSPADM

## 2024-08-09 RX ORDER — OXYCODONE HCL 5 MG/5 ML
5 SOLUTION, ORAL ORAL
Status: DISCONTINUED | OUTPATIENT
Start: 2024-08-09 | End: 2024-08-10 | Stop reason: HOSPADM

## 2024-08-09 RX ORDER — SODIUM CHLORIDE, SODIUM LACTATE, POTASSIUM CHLORIDE, CALCIUM CHLORIDE 600; 310; 30; 20 MG/100ML; MG/100ML; MG/100ML; MG/100ML
INJECTION, SOLUTION INTRAVENOUS CONTINUOUS
Status: DISCONTINUED | OUTPATIENT
Start: 2024-08-09 | End: 2024-08-09 | Stop reason: HOSPADM

## 2024-08-09 RX ORDER — DEXAMETHASONE SODIUM PHOSPHATE 4 MG/ML
INJECTION, SOLUTION INTRA-ARTICULAR; INTRALESIONAL; INTRAMUSCULAR; INTRAVENOUS; SOFT TISSUE PRN
Status: DISCONTINUED | OUTPATIENT
Start: 2024-08-09 | End: 2024-08-09 | Stop reason: SURG

## 2024-08-09 RX ORDER — ACETAMINOPHEN 500 MG
1000 TABLET ORAL ONCE
Status: DISCONTINUED | OUTPATIENT
Start: 2024-08-09 | End: 2024-08-09 | Stop reason: HOSPADM

## 2024-08-09 RX ORDER — ROCURONIUM BROMIDE 10 MG/ML
INJECTION, SOLUTION INTRAVENOUS PRN
Status: DISCONTINUED | OUTPATIENT
Start: 2024-08-09 | End: 2024-08-09 | Stop reason: SURG

## 2024-08-09 RX ADMIN — DEXAMETHASONE SODIUM PHOSPHATE 8 MG: 4 INJECTION INTRA-ARTICULAR; INTRALESIONAL; INTRAMUSCULAR; INTRAVENOUS; SOFT TISSUE at 15:23

## 2024-08-09 RX ADMIN — SODIUM CHLORIDE, POTASSIUM CHLORIDE, SODIUM LACTATE AND CALCIUM CHLORIDE: 600; 310; 30; 20 INJECTION, SOLUTION INTRAVENOUS at 12:22

## 2024-08-09 RX ADMIN — ACETAMINOPHEN 1000 MG: 500 TABLET ORAL at 20:43

## 2024-08-09 RX ADMIN — SODIUM CHLORIDE, POTASSIUM CHLORIDE, SODIUM LACTATE AND CALCIUM CHLORIDE: 600; 310; 30; 20 INJECTION, SOLUTION INTRAVENOUS at 23:47

## 2024-08-09 RX ADMIN — FENTANYL CITRATE 50 MCG: 50 INJECTION, SOLUTION INTRAMUSCULAR; INTRAVENOUS at 16:23

## 2024-08-09 RX ADMIN — ONDANSETRON 4 MG: 2 INJECTION INTRAMUSCULAR; INTRAVENOUS at 17:56

## 2024-08-09 RX ADMIN — METHOCARBAMOL 1000 MG: 100 INJECTION, SOLUTION INTRAMUSCULAR; INTRAVENOUS at 18:03

## 2024-08-09 RX ADMIN — Medication 50 MG: at 15:40

## 2024-08-09 RX ADMIN — SODIUM CHLORIDE, POTASSIUM CHLORIDE, SODIUM LACTATE AND CALCIUM CHLORIDE: 600; 310; 30; 20 INJECTION, SOLUTION INTRAVENOUS at 15:12

## 2024-08-09 RX ADMIN — HYDROMORPHONE HYDROCHLORIDE 2 MG: 2 INJECTION INTRAMUSCULAR; INTRAVENOUS; SUBCUTANEOUS at 15:34

## 2024-08-09 RX ADMIN — CEFAZOLIN 2 G: 1 INJECTION, POWDER, FOR SOLUTION INTRAMUSCULAR; INTRAVENOUS at 15:23

## 2024-08-09 RX ADMIN — HEPARIN SODIUM 5000 UNITS: 5000 INJECTION, SOLUTION INTRAVENOUS; SUBCUTANEOUS at 12:27

## 2024-08-09 RX ADMIN — METHOCARBAMOL 1000 MG: 100 INJECTION, SOLUTION INTRAMUSCULAR; INTRAVENOUS at 22:22

## 2024-08-09 RX ADMIN — PROPOFOL 100 MG: 10 INJECTION, EMULSION INTRAVENOUS at 16:15

## 2024-08-09 RX ADMIN — PANTOPRAZOLE SODIUM 40 MG: 40 INJECTION, POWDER, FOR SOLUTION INTRAVENOUS at 22:10

## 2024-08-09 RX ADMIN — DEXAMETHASONE SODIUM PHOSPHATE 4 MG: 4 INJECTION INTRA-ARTICULAR; INTRALESIONAL; INTRAMUSCULAR; INTRAVENOUS; SOFT TISSUE at 23:50

## 2024-08-09 RX ADMIN — PROPOFOL 200 MG: 10 INJECTION, EMULSION INTRAVENOUS at 15:20

## 2024-08-09 RX ADMIN — DEXMEDETOMIDINE HYDROCHLORIDE 20 MCG: 100 INJECTION, SOLUTION INTRAVENOUS at 16:28

## 2024-08-09 RX ADMIN — OXYCODONE HYDROCHLORIDE 10 MG: 5 SOLUTION ORAL at 17:52

## 2024-08-09 RX ADMIN — SIMETHICONE 125 MG: 125 TABLET, CHEWABLE ORAL at 20:45

## 2024-08-09 RX ADMIN — GABAPENTIN 300 MG: 250 SOLUTION ORAL at 21:05

## 2024-08-09 RX ADMIN — KETOROLAC TROMETHAMINE 15 MG: 15 INJECTION, SOLUTION INTRAMUSCULAR; INTRAVENOUS at 20:35

## 2024-08-09 RX ADMIN — ONDANSETRON 4 MG: 2 INJECTION INTRAMUSCULAR; INTRAVENOUS at 16:29

## 2024-08-09 RX ADMIN — LIDOCAINE HYDROCHLORIDE 4 ML: 40 SOLUTION TOPICAL at 15:20

## 2024-08-09 RX ADMIN — ROCURONIUM BROMIDE 50 MG: 50 INJECTION, SOLUTION INTRAVENOUS at 15:20

## 2024-08-09 RX ADMIN — ONDANSETRON 4 MG: 2 INJECTION INTRAMUSCULAR; INTRAVENOUS at 20:24

## 2024-08-09 RX ADMIN — LIDOCAINE HYDROCHLORIDE 100 MG: 20 INJECTION, SOLUTION EPIDURAL; INFILTRATION; INTRACAUDAL at 15:20

## 2024-08-09 RX ADMIN — FENTANYL CITRATE 50 MCG: 50 INJECTION, SOLUTION INTRAMUSCULAR; INTRAVENOUS at 16:12

## 2024-08-09 RX ADMIN — SUGAMMADEX 200 MG: 100 INJECTION, SOLUTION INTRAVENOUS at 16:30

## 2024-08-09 RX ADMIN — FENTANYL CITRATE 50 MCG: 50 INJECTION, SOLUTION INTRAMUSCULAR; INTRAVENOUS at 17:52

## 2024-08-09 RX ADMIN — OXYCODONE HYDROCHLORIDE 10 MG: 10 TABLET, FILM COATED, EXTENDED RELEASE ORAL at 13:15

## 2024-08-09 RX ADMIN — FENTANYL CITRATE 100 MCG: 50 INJECTION, SOLUTION INTRAMUSCULAR; INTRAVENOUS at 15:20

## 2024-08-09 SDOH — ECONOMIC STABILITY: TRANSPORTATION INSECURITY
IN THE PAST 12 MONTHS, HAS LACK OF RELIABLE TRANSPORTATION KEPT YOU FROM MEDICAL APPOINTMENTS, MEETINGS, WORK OR FROM GETTING THINGS NEEDED FOR DAILY LIVING?: NO

## 2024-08-09 SDOH — ECONOMIC STABILITY: TRANSPORTATION INSECURITY
IN THE PAST 12 MONTHS, HAS THE LACK OF TRANSPORTATION KEPT YOU FROM MEDICAL APPOINTMENTS OR FROM GETTING MEDICATIONS?: NO

## 2024-08-09 ASSESSMENT — FIBROSIS 4 INDEX
FIB4 SCORE: 0.65
FIB4 SCORE: 0.65

## 2024-08-09 ASSESSMENT — COGNITIVE AND FUNCTIONAL STATUS - GENERAL
DRESSING REGULAR LOWER BODY CLOTHING: A LITTLE
DAILY ACTIVITIY SCORE: 20
TURNING FROM BACK TO SIDE WHILE IN FLAT BAD: A LITTLE
MOVING TO AND FROM BED TO CHAIR: A LITTLE
MOVING FROM LYING ON BACK TO SITTING ON SIDE OF FLAT BED: A LITTLE
SUGGESTED CMS G CODE MODIFIER DAILY ACTIVITY: CJ
TOILETING: A LITTLE
STANDING UP FROM CHAIR USING ARMS: A LITTLE
MOBILITY SCORE: 18
CLIMB 3 TO 5 STEPS WITH RAILING: A LITTLE
SUGGESTED CMS G CODE MODIFIER MOBILITY: CK
MOBILITY SCORE: 18
STANDING UP FROM CHAIR USING ARMS: A LITTLE
WALKING IN HOSPITAL ROOM: A LITTLE
MOVING TO AND FROM BED TO CHAIR: A LITTLE
CLIMB 3 TO 5 STEPS WITH RAILING: A LITTLE
WALKING IN HOSPITAL ROOM: A LITTLE
SUGGESTED CMS G CODE MODIFIER MOBILITY: CK
TURNING FROM BACK TO SIDE WHILE IN FLAT BAD: A LITTLE
HELP NEEDED FOR BATHING: A LITTLE
MOVING FROM LYING ON BACK TO SITTING ON SIDE OF FLAT BED: A LITTLE
DRESSING REGULAR UPPER BODY CLOTHING: A LITTLE

## 2024-08-09 ASSESSMENT — PAIN DESCRIPTION - PAIN TYPE
TYPE: ACUTE PAIN;SURGICAL PAIN
TYPE: SURGICAL PAIN

## 2024-08-09 ASSESSMENT — PATIENT HEALTH QUESTIONNAIRE - PHQ9
SUM OF ALL RESPONSES TO PHQ9 QUESTIONS 1 AND 2: 0
2. FEELING DOWN, DEPRESSED, IRRITABLE, OR HOPELESS: NOT AT ALL
1. LITTLE INTEREST OR PLEASURE IN DOING THINGS: NOT AT ALL

## 2024-08-09 ASSESSMENT — SOCIAL DETERMINANTS OF HEALTH (SDOH)
WITHIN THE LAST YEAR, HAVE YOU BEEN KICKED, HIT, SLAPPED, OR OTHERWISE PHYSICALLY HURT BY YOUR PARTNER OR EX-PARTNER?: NO
IN THE PAST 12 MONTHS, HAS THE ELECTRIC, GAS, OIL, OR WATER COMPANY THREATENED TO SHUT OFF SERVICE IN YOUR HOME?: NO
WITHIN THE PAST 12 MONTHS, YOU WORRIED THAT YOUR FOOD WOULD RUN OUT BEFORE YOU GOT THE MONEY TO BUY MORE: NEVER TRUE
WITHIN THE LAST YEAR, HAVE TO BEEN RAPED OR FORCED TO HAVE ANY KIND OF SEXUAL ACTIVITY BY YOUR PARTNER OR EX-PARTNER?: NO
WITHIN THE LAST YEAR, HAVE TO BEEN RAPED OR FORCED TO HAVE ANY KIND OF SEXUAL ACTIVITY BY YOUR PARTNER OR EX-PARTNER?: NO
WITHIN THE LAST YEAR, HAVE YOU BEEN HUMILIATED OR EMOTIONALLY ABUSED IN OTHER WAYS BY YOUR PARTNER OR EX-PARTNER?: NO
WITHIN THE LAST YEAR, HAVE YOU BEEN KICKED, HIT, SLAPPED, OR OTHERWISE PHYSICALLY HURT BY YOUR PARTNER OR EX-PARTNER?: NO
WITHIN THE LAST YEAR, HAVE YOU BEEN AFRAID OF YOUR PARTNER OR EX-PARTNER?: NO
WITHIN THE LAST YEAR, HAVE YOU BEEN HUMILIATED OR EMOTIONALLY ABUSED IN OTHER WAYS BY YOUR PARTNER OR EX-PARTNER?: NO
WITHIN THE PAST 12 MONTHS, THE FOOD YOU BOUGHT JUST DIDN'T LAST AND YOU DIDN'T HAVE MONEY TO GET MORE: NEVER TRUE
WITHIN THE LAST YEAR, HAVE YOU BEEN AFRAID OF YOUR PARTNER OR EX-PARTNER?: NO

## 2024-08-09 ASSESSMENT — PAIN SCALES - GENERAL: PAIN_LEVEL: 2

## 2024-08-09 NOTE — OP REPORT
[facility]    OPERATIVE REPORT         DATE OF OPERATION: 8/9/2024    SURGEON: Gavin Rogel MD MPH FACS [facility]    ASSISTANT(S): Blank Cancino PA-C    ANESTHESIOLOGIST(S): Johan Cross MD    ANESTHESIA: General with local anesthetic    PREOPERATIVE DIAGNOSES:   Obesity, class II (BMI of 37.3 kg/m2)  HTN and GERD    POSTOPERATIVE DIAGNOSES:   Obesity, class II (BMI of 37.3 kg/m2)  HTN and GERD  Small, sliding hiatal hernia (type I)    OPERATION(S) PERFORMED: Robotic sleeve gastrectomy and posterior hiatal cruroplasty    ESTIMATED BLOOD LOSS: 20 ml    SPECIMEN(S): Sleeve gastrectomy    COMPLICATIONS: None    BACKGROUND: The patient is a 48 y.o. female with a diagnosis of obesity (class II), whose current Body mass index is 37.29 kg/m². Her comorbidities include: HTN and GERD. The patient has failed multiple attempts at non-surgical weight loss. Thus, she presents to undergo a robotic vs laparoscopic sleeve gastrectomy, and possible hiatal cruroplasty, for treatment of morbid obesity.     We discussed the risks of surgery including infection, bleeding, need for transfusion, blood clot formation, pulmonary embolus, pneumonia, leak or perforation, recurrence of symptoms, and death. In addition, the risks of general anesthetic were discussed, including MI, CVA, reaction to anesthetic medications, or sudden death. The patient understands all of the above risks and all questions were answered to her satisfaction.    OPERATIVE FINDINGS: During this procedure, a small, sliding hiatal hernia (type I) was identified. Otherwise, gastric anatomy was normal.    OPERATIVE PROCEDURE: Following obtaining informed consent, the patient was brought to the operating room and placed in the supine position. A preoperative dose of antibiotics was administered, as well as, subcutaneous heparin in the preoperative holding area. General anesthesia was smoothly induced. The abdomen was prepped and draped in the  "usual sterile fashion. Following a formal time-out and site verification, the procedure was undertaken.    A small skin incision was created in the left upper quadrant, at the \"Clark's point\", and a Veress needle was inserted without incident. The saline drip test was negative. Pneumoperitoneum was created with CO2 to 15 mmHg pressure without any physiologic derangements. A 5 mm blunt tipped working port was placed approximately 14 cm below the xyphoid and to the left of umbilicus. It was placed without incident and a 0 degree 5 mm laparoscope was introduced into the abdomen with its camera attached. No injuries were noted from initial or subsequent trocar placement. Four robotic trocars - three 8 mm and one 12 mm - were placed in the bilateral upper quadrants, all under direct vision. The patient was placed in a reverse Trendelenburg position. The [employer] Xi robotic system was then successfully docked above the patient's epigastrium and the remainder of this operation was performed with assistance of the robot.    Beginning 5 cm proximal to the pylorus, and staying along the greater curvature, the Vessel sealer was used to transect the greater omentum and short gastric vessels up to the gastroesophageal junction and angle of His. All retroperitoneal attachments to the posterior wall of stomach were also transected by using the Vessel sealer. Special care was undertaken to avoid the patient's left gastric artery and splenic vessels. Dissection of the gastroesophageal junction was then undertaken. The right and left crura were identified. An approximately 3-cm hiatal hernia was found. This was repaired with two interrupted #0 Ethibond suture(s) posterior to the esophagus to reapproximate the right and left crura. One additional #0 Ethibond suture was used to approximate the proximal fundus remnant to the left yuni, thus augmenting patient's angle of His.    Robotic [employer] JOVAN 60-mm stapling device was then " inserted after a 40-Maori bougie was passed into the stomach. The sleeve gastrectomy was initiated with a blue cartridge load, starting ~5 cm proximal to the pylorus and proceeding along the greater curvature. The stomach was sequentially stapled with additional white loads, progressing around the 40-Maori bougie tube, until the sleeve was completely free from the remnant stomach. The staple line was inspected and found to be intact. The previously transected omentum was then buttressed to several sites along the staple line by using a 2-0 Vicryl suture. A leak test was performed by instilling saline around the staple line and insufflating the stomach with ~ 200 ml of air via the orogastric tube. The leak test was negative. The EndoCatch bag was then inserted around the remnant stomach, which was placed into the bag and removed from the abdominal cavity. The fascia at the 12-mm trocar was then reapproximated by using a laparoscopic suture passer and 0-Vicryl suture. The skin at all trocar sites was reapproximated using 4-0 Monocryl. Local anesthetic was injected into all trocar sites. The abdomen was cleaned and dried. Dermabond sterile skin glue was applied to all incisions. The patient was then awakened from anesthesia and transferred to the Post Anesthesia Care Unit in a stable condition. At the conclusion of the case, the sponge, needle, and instrument counts were correct x 2.    Dr. Rogel was present and scrubbed throughout the entirety of this case.

## 2024-08-09 NOTE — ANESTHESIA PROCEDURE NOTES
Airway    Date/Time: 8/9/2024 3:21 PM    Performed by: Johan Cross M.D.  Authorized by: Johan Cross M.D.    Location:  OR  Urgency:  Elective  Difficult Airway: No    Indications for Airway Management:  Anesthesia      Spontaneous Ventilation: absent    Sedation Level:  Deep  Preoxygenated: Yes    Patient Position:  Sniffing  Final Airway Type:  Endotracheal airway  Final Endotracheal Airway:  ETT  Cuffed: Yes    Technique Used for Successful ETT Placement:  Direct laryngoscopy  Devices/Methods Used in Placement:  Intubating stylet    Insertion Site:  Oral  Blade Type:  Stephens  Laryngoscope Blade/Videolaryngoscope Blade Size:  2  ETT Size (mm):  7.0  Measured from:  Teeth  ETT to Teeth (cm):  21  Placement Verified by: auscultation and capnometry    Cormack-Lehane Classification:  Grade IIb - view of arytenoids or posterior of glottis only  Number of Attempts at Approach:  1

## 2024-08-09 NOTE — ANESTHESIA PREPROCEDURE EVALUATION
Case: [id number] Date/Time: 08/09/24 1430    Procedure: ROBOTIC SLEEVE GASTRECTOMY, HIATAL HERNIA REPAIR    Pre-op diagnosis: morbid obesity    Location: [place] [address] / SURGERY [facility]    Surgeons: Gavin Rogel M.D.            Relevant Problems   No relevant active problems       Physical Exam    Airway   Mallampati: II  TM distance: >3 FB  Neck ROM: full       Cardiovascular   Rhythm: regular  Rate: normal     Dental - normal exam           Pulmonary   Breath sounds clear to auscultation     Abdominal   (+) obese     Neurological - normal exam                   Anesthesia Plan    ASA 3   ASA physical status 3 criteria: hypertension - poorly controlled    Plan - general       Airway plan will be ETT          Induction: intravenous    Postoperative Plan: Postoperative administration of opioids is intended.    Pertinent diagnostic labs and testing reviewed    Informed Consent:    Anesthetic plan and risks discussed with patient.    Use of blood products discussed with: patient whom consented to blood products.

## 2024-08-09 NOTE — OR NURSING
1702 Pt arrived to PACU with Anesthesiologist and OR RN. OPA in place. Even, unlabored respirations. VSS. Abdominal lap sites x4 with dermabond CDI. Ice pack applied.    1715 OPA d/c'd. AAOx4.  Denies pain. Denies nausea.     1815 Pt meets floor criteria. Report called to ANA Simpson on [address].     1829 Pt transported to [facility] with transport. Chart, belongings, and full oxygen tank with patient.

## 2024-08-09 NOTE — PROGRESS NOTES
Medication history reviewed with PT at bedside    [facility] is complete per Pt reporting    Allergies reviewed.     Patient denies any outpatient antibiotics in the last 30 days.     Patient is not taking anticoagulants.    Preferred pharmacy for this visit - [facility] on [address] ([phone])

## 2024-08-10 ENCOUNTER — PHARMACY VISIT (OUTPATIENT)
Dept: PHARMACY | Facility: MEDICAL CENTER | Age: 49
End: 2024-08-10
Payer: COMMERCIAL

## 2024-08-10 VITALS
HEART RATE: 94 BPM | BODY MASS INDEX: 37.38 KG/M2 | RESPIRATION RATE: 17 BRPM | OXYGEN SATURATION: 98 % | DIASTOLIC BLOOD PRESSURE: 53 MMHG | WEIGHT: 232.59 LBS | HEIGHT: 66 IN | SYSTOLIC BLOOD PRESSURE: 120 MMHG | TEMPERATURE: 97.1 F

## 2024-08-10 LAB
ANION GAP SERPL CALC-SCNC: 17 MMOL/L (ref 7–16)
BUN SERPL-MCNC: 16 MG/DL (ref 8–22)
CALCIUM SERPL-MCNC: 8.7 MG/DL (ref 8.5–10.5)
CHLORIDE SERPL-SCNC: 99 MMOL/L (ref 96–112)
CO2 SERPL-SCNC: 21 MMOL/L (ref 20–33)
CREAT SERPL-MCNC: 0.65 MG/DL (ref 0.5–1.4)
ERYTHROCYTE [DISTWIDTH] IN BLOOD BY AUTOMATED COUNT: 40.1 FL (ref 35.9–50)
GFR SERPLBLD CREATININE-BSD FMLA CKD-EPI: 109 ML/MIN/1.73 M 2
GLUCOSE SERPL-MCNC: 130 MG/DL (ref 65–99)
HCT VFR BLD AUTO: 41.6 % (ref 37–47)
HGB BLD-MCNC: 13.5 G/DL (ref 12–16)
MCH RBC QN AUTO: 28.5 PG (ref 27–33)
MCHC RBC AUTO-ENTMCNC: 32.5 G/DL (ref 32.2–35.5)
MCV RBC AUTO: 87.9 FL (ref 81.4–97.8)
PLATELET # BLD AUTO: 254 K/UL (ref 164–446)
PMV BLD AUTO: 8.4 FL (ref 9–12.9)
POTASSIUM SERPL-SCNC: 4.8 MMOL/L (ref 3.6–5.5)
RBC # BLD AUTO: 4.73 M/UL (ref 4.2–5.4)
SODIUM SERPL-SCNC: 137 MMOL/L (ref 135–145)
WBC # BLD AUTO: 10.8 K/UL (ref 4.8–10.8)

## 2024-08-10 PROCEDURE — RXMED WILLOW AMBULATORY MEDICATION CHARGE: Performed by: SURGERY

## 2024-08-10 PROCEDURE — 700111 HCHG RX REV CODE 636 W/ 250 OVERRIDE (IP): Performed by: SURGERY

## 2024-08-10 PROCEDURE — 85027 COMPLETE CBC AUTOMATED: CPT

## 2024-08-10 PROCEDURE — 80048 BASIC METABOLIC PNL TOTAL CA: CPT

## 2024-08-10 PROCEDURE — A9270 NON-COVERED ITEM OR SERVICE: HCPCS | Performed by: SURGERY

## 2024-08-10 PROCEDURE — 700105 HCHG RX REV CODE 258: Performed by: SURGERY

## 2024-08-10 PROCEDURE — 700102 HCHG RX REV CODE 250 W/ 637 OVERRIDE(OP): Performed by: SURGERY

## 2024-08-10 RX ORDER — LORAZEPAM 1 MG/1
1 TABLET ORAL EVERY 8 HOURS PRN
Qty: 40 TABLET | Refills: 0 | Status: SHIPPED | OUTPATIENT
Start: 2024-08-10 | End: 2024-09-09

## 2024-08-10 RX ORDER — METHOCARBAMOL 750 MG/1
750 TABLET, FILM COATED ORAL 3 TIMES DAILY PRN
Qty: 90 TABLET | Refills: 1 | Status: SHIPPED | OUTPATIENT
Start: 2024-08-10

## 2024-08-10 RX ADMIN — GABAPENTIN 300 MG: 250 SOLUTION ORAL at 13:37

## 2024-08-10 RX ADMIN — POLYETHYLENE GLYCOL 3350 1 PACKET: 17 POWDER, FOR SOLUTION ORAL at 08:24

## 2024-08-10 RX ADMIN — METHOCARBAMOL 1000 MG: 100 INJECTION, SOLUTION INTRAMUSCULAR; INTRAVENOUS at 08:38

## 2024-08-10 RX ADMIN — GABAPENTIN 300 MG: 250 SOLUTION ORAL at 08:24

## 2024-08-10 RX ADMIN — ONDANSETRON 4 MG: 2 INJECTION INTRAMUSCULAR; INTRAVENOUS at 00:52

## 2024-08-10 RX ADMIN — KETOROLAC TROMETHAMINE 15 MG: 15 INJECTION, SOLUTION INTRAMUSCULAR; INTRAVENOUS at 00:58

## 2024-08-10 RX ADMIN — HYDROCHLOROTHIAZIDE 12.5 MG: 12.5 TABLET ORAL at 08:13

## 2024-08-10 RX ADMIN — SIMETHICONE 125 MG: 125 TABLET, CHEWABLE ORAL at 09:44

## 2024-08-10 RX ADMIN — LOSARTAN POTASSIUM 50 MG: 50 TABLET, FILM COATED ORAL at 08:26

## 2024-08-10 RX ADMIN — KETOROLAC TROMETHAMINE 15 MG: 15 INJECTION, SOLUTION INTRAMUSCULAR; INTRAVENOUS at 12:50

## 2024-08-10 RX ADMIN — OXYCODONE HYDROCHLORIDE 10 MG: 5 SOLUTION ORAL at 09:07

## 2024-08-10 RX ADMIN — DEXAMETHASONE SODIUM PHOSPHATE 4 MG: 4 INJECTION INTRA-ARTICULAR; INTRALESIONAL; INTRAMUSCULAR; INTRAVENOUS; SOFT TISSUE at 12:49

## 2024-08-10 RX ADMIN — ACETAMINOPHEN 1000 MG: 500 TABLET ORAL at 12:48

## 2024-08-10 RX ADMIN — ACETAMINOPHEN 1000 MG: 500 TABLET ORAL at 08:13

## 2024-08-10 RX ADMIN — KETOROLAC TROMETHAMINE 15 MG: 15 INJECTION, SOLUTION INTRAMUSCULAR; INTRAVENOUS at 07:34

## 2024-08-10 RX ADMIN — ONDANSETRON 4 MG: 2 INJECTION INTRAMUSCULAR; INTRAVENOUS at 13:10

## 2024-08-10 RX ADMIN — PANTOPRAZOLE SODIUM 40 MG: 40 INJECTION, POWDER, FOR SOLUTION INTRAVENOUS at 07:58

## 2024-08-10 RX ADMIN — ONDANSETRON 4 MG: 2 INJECTION INTRAMUSCULAR; INTRAVENOUS at 07:44

## 2024-08-10 RX ADMIN — HEPARIN SODIUM 5000 UNITS: 5000 INJECTION, SOLUTION INTRAVENOUS; SUBCUTANEOUS at 08:10

## 2024-08-10 RX ADMIN — DEXAMETHASONE SODIUM PHOSPHATE 4 MG: 4 INJECTION INTRA-ARTICULAR; INTRALESIONAL; INTRAMUSCULAR; INTRAVENOUS; SOFT TISSUE at 07:50

## 2024-08-10 ASSESSMENT — SOCIAL DETERMINANTS OF HEALTH (SDOH)
IN THE PAST 12 MONTHS, HAS THE ELECTRIC, GAS, OIL, OR WATER COMPANY THREATENED TO SHUT OFF SERVICE IN YOUR HOME?: NO
WITHIN THE PAST 12 MONTHS, THE FOOD YOU BOUGHT JUST DIDN'T LAST AND YOU DIDN'T HAVE MONEY TO GET MORE: NEVER TRUE
WITHIN THE PAST 12 MONTHS, YOU WORRIED THAT YOUR FOOD WOULD RUN OUT BEFORE YOU GOT THE MONEY TO BUY MORE: NEVER TRUE

## 2024-08-10 ASSESSMENT — LIFESTYLE VARIABLES
AVERAGE NUMBER OF DAYS PER WEEK YOU HAVE A DRINK CONTAINING ALCOHOL: 0
HAVE PEOPLE ANNOYED YOU BY CRITICIZING YOUR DRINKING: NO
CONSUMPTION TOTAL: NEGATIVE
TOTAL SCORE: 0
ON A TYPICAL DAY WHEN YOU DRINK ALCOHOL HOW MANY DRINKS DO YOU HAVE: 1
ALCOHOL_USE: YES
EVER HAD A DRINK FIRST THING IN THE MORNING TO STEADY YOUR NERVES TO GET RID OF A HANGOVER: NO
HAVE YOU EVER FELT YOU SHOULD CUT DOWN ON YOUR DRINKING: NO
TOTAL SCORE: 0
DOES PATIENT WANT TO STOP DRINKING: NO
EVER FELT BAD OR GUILTY ABOUT YOUR DRINKING: NO
HOW MANY TIMES IN THE PAST YEAR HAVE YOU HAD 5 OR MORE DRINKS IN A DAY: 0
TOTAL SCORE: 0

## 2024-08-10 ASSESSMENT — PAIN DESCRIPTION - PAIN TYPE: TYPE: SURGICAL PAIN

## 2024-08-10 ASSESSMENT — PATIENT HEALTH QUESTIONNAIRE - PHQ9
2. FEELING DOWN, DEPRESSED, IRRITABLE, OR HOPELESS: NOT AT ALL
1. LITTLE INTEREST OR PLEASURE IN DOING THINGS: NOT AT ALL
SUM OF ALL RESPONSES TO PHQ9 QUESTIONS 1 AND 2: 0

## 2024-08-10 NOTE — DISCHARGE SUMMARY
Discharge Summary    CHIEF COMPLAINT ON ADMISSION  Obesity, class II ([facility])     Reason for Admission  Obesity, class II (BMI of 37.3 kg/m2)  HTN and GERD  Small, sliding hiatal hernia (type I)    Admission Date  8/9/2024    CODE STATUS  Full Code    HPI & HOSPITAL COURSE  This is a 48 y.o. female here with obesity (class II), HTN, and GERD, in the setting of a small hiatal hernia, who underwent a robotic sleeve gastrectomy and hiatal hernia repair on 8/9/2024. Her postoperative recovery was unremarkable - by POD #1, symptoms of pain and nausea were well controlled, patient was tolerating sips of clear liquids and water, ambulating in the hallways w/o difficulties, and voiding spontaneously. Therefore, she is discharged in good and stable condition to home with close outpatient follow-up.    The patient recovered much more quickly than anticipated on admission.    Discharge Date  8/10/2024    FOLLOW UP ITEMS POST DISCHARGE  None    DISCHARGE DIAGNOSES  Obesity, class II (BMI of 37.3 kg/m2)  HTN and GERD  Small, sliding hiatal hernia (type I)    FOLLOW UP  No future appointments.  No follow-up provider specified.    MEDICATIONS ON DISCHARGE     Medication List        START taking these medications        Instructions   LORazepam 1 MG Tabs  Commonly known as: Ativan   Take 1 Tablet by mouth every 8 hours as needed (Breakthrough nausea) for up to 30 days.  Dose: 1 mg     methocarbamol 750 MG Tabs  Commonly known as: Robaxin   Take 1 Tablet by mouth 3 times a day as needed (Abdominal or inguinal pain).  Dose: 750 mg            CONTINUE taking these medications        Instructions   acetaminophen 500 MG Tabs  Commonly known as: Tylenol   Take 1,000 mg by mouth every 6 hours as needed for Moderate Pain.  Dose: 1,000 mg     diclofenac DR 50 MG Tbec  Commonly known as: Voltaren   Take 50 mg by mouth 2 times a day as needed (pain/swelling).  Dose: 50 mg     losartan-hydrochlorothiazide 50-12.5 MG per tablet  Commonly known as:  Hyzaar   Take 1 Tablet by mouth every day.   Dose: 1 Tablet     ROPINIRole 0.25 MG Tabs  Commonly known as: Requip   Take 0.25 mg by mouth 3 times a day as needed (Restless leg).  Dose: 0.25 mg            STOP taking these medications      cyclobenzaprine 5 mg tablet  Commonly known as: Flexeril     scopolamine 1 mg/72hr Pt72  Commonly known as: Transderm-Scop              Allergies  No Known Allergies    DIET  Orders Placed This Encounter   Procedures    Diet Order Diet: Clear Liquid; Miscellaneous modifications: (optional): Bariatric; Tray Modifications (optional): No Straws     Standing Status:   Standing     Number of Occurrences:   1     Order Specific Question:   Diet:     Answer:   Clear Liquid [10]     Order Specific Question:   Miscellaneous modifications: (optional)     Answer:   Bariatric [3]     Order Specific Question:   Tray Modifications (optional)     Answer:   No Straws       ACTIVITY  Light duty.  Weight bearing as tolerated    CONSULTATIONS  None    PROCEDURES  Robotic sleeve gastrectomy and posterior hiatal cruroplasty on 8/9/2024.    LABORATORY  Lab Results   Component Value Date    SODIUM 137 08/10/2024    POTASSIUM 4.8 08/10/2024    CHLORIDE 99 08/10/2024    CO2 21 08/10/2024    GLUCOSE 130 (H) 08/10/2024    BUN 16 08/10/2024    CREATININE 0.65 08/10/2024        Lab Results   Component Value Date    WBC 10.8 08/10/2024    HEMOGLOBIN 13.5 08/10/2024    HEMATOCRIT 41.6 08/10/2024    PLATELETCT 254 08/10/2024        Total time of the discharge process exceeds 20 minutes.

## 2024-08-10 NOTE — PROGRESS NOTES
4 Eyes Skin Assessment Completed by ANA Simpson and ANA Olsen.    Head WDL  Ears WDL  Nose WDL  Mouth WDL  Neck WDL  Breast/Chest WDL  Shoulder Blades WDL  Spine WDL  (R) Arm/Elbow/Hand WDL  (L) Arm/Elbow/Hand WDL  Abdomen Incision 4 lap sites with dermabond   Groin WDL  Scrotum/Coccyx/Buttocks   (R) Leg WDL  (L) Leg WDL  (R) Heel/Foot/Toe WDL  (L) Heel/Foot/Toe WDL          Devices In Places NC, PIV      Interventions In Place Pillows and Pressure Redistribution Mattress    Possible Skin Injury No    Pictures Uploaded Into Epic N/A  Wound Consult Placed N/A  RN Wound Prevention Protocol Ordered No

## 2024-08-10 NOTE — DISCHARGE INSTRUCTIONS
[facility]  Post Weight-Loss Surgery Discharge Instructions      1. DIET: Follow the diet progression detailed in your post-op booklet. Progressing as instructed will make for a smooth transition after surgery and prevent any discomfort, associated with eating foods before your stomach is ready, or eating too much. Drinking water for hydration and protein shakes are much more important than food intake in the first week or two after surgery. Drink enough water to keep your urine pale yellow. Increase water intake if your urine is a darker yellow or if have burning with urination. Occasional nausea and vomiting in the immediate post-operative period are normal. Please take your anti-nausea medication (e.g. “Zofran” or ondansetron) as instructed and continue to stay hydrated.    2. SUPPLEMENTS: Start your supplements 1-2 weeks after surgery. You may need to cut some supplements in half, or crush them, initially. Follow the guidelines from your supplement handout given to you during your pre-op class.     3. ACTIVITIES: After discharge from the hospital, you may resume full routine activities. However, there should be no heavy lifting (greater than 15 pounds) and no strenuous activities until after your follow-up visit. Otherwise, routine activities of daily living are acceptable. More movement and walking after surgery will speed up your recovery process.    4. DRIVING: You may drive whenever you are off narcotic pain medications and are able to perform the activities needed to drive, i.e. turning, bending, twisting, etc, without significant pain.    5. BATHING AND WOUND CARE: You may get your incisions wet 24 hours after surgery. You may shower, but do not submerge in a bath, hot tub, or swimming pool for at least two weeks. If present, dressings may come off after 24 hours. Your incisions are covered with special skin glue - it should peel off on its own within the next few weeks. If you have  steri-strips covering your incisions, they will fall off over the next few weeks. You may notice some clear or slightly bloody drainage from your wounds and there may be some mild redness or bruising around your incision sites. This is normal.    6. BOWEL FUNCTION: Constipation is common after this operation, especially with narcotic pain medications. The combination of pain medication, dehydration, and decreased activity levels can cause constipation in otherwise normal patients. If you feel this is occurring, take a stool softener (Colace, Senna, etc.) or a laxative (Milk of Magnesia, Miralax, etc.) until the problem has resolved. Diarrhea, to a lesser degree, in the first few days after surgery can also be normal. You may notice that it is dark in color or see blood, which is also normal, and should subside in the first week post-op.    7. HOME MEDICATION/PAIN MEDICATION: You may resume all home medications as prescribed, except for your diabetic medications. Please reach out to your Primary Care Physician or Endocrinologist to discuss readjusting your diabetic medications. You have been given a prescription for pain medication preoperatively. Please take these as directed. It is important to remember not to take medications on an empty stomach as this may cause nausea. For minimal discomfort you may use liquid Tylenol 650 mg every 4 hours. DO NOT exceed 4,000 mg of Tylenol in 24 hours. DO NOT use non-steroidal anti-inflammatory medication such as: Aspirin, Ibuprofen, Advil, Motrin, Aleve; or steroid medications. These medications can cause ulcers after weight loss surgery.    8. CALL OUR OFFICE - [phone] - IF YOU HAVE:     1. Fevers to more than 101.5 F   2. Leg pain or swelling  3. Drainage or fluid from incision that may be foul smelling, increased tenderness or soreness at the wound, or the wound edges are no longer together, redness or  swelling at the incision site.   4. Night sweats   5. Shaking or  chills   6. Persistent nausea or vomiting for over 24 hours. Use your anti-nausea medication as prescribed.   7. Call 911 if sudden onset of chest pain or shortness of breath that does not improve with 5-10 minutes of rest.    9. FOLLOW UP APPOINTMENT: Contact our office at [phone] for a follow-up appointment within 2 weeks following your procedure. Our office hours are Monday-Thursday, 7.30 am-4.30 pm and from 8.00 am-3.00 pm on Friday. Please try to call during these hours when we are better able to assist you.    If you have any additional questions, please do not hesitate to call our office and speak to the staff or provider on call.    Office Address:  [facility]  [email or web address] [facility],  [address],  [place] [address]    Thank you for this opportunity to provide excellent surgical care for you today.        Post-operative Nutrition Guidelines for Shaina-en-Y Gastric Bypass, Vertical Sleeve Gastrectomy and Duodenal Switch        Hospital Guidelines    Immediately After surgery    Sleeve Gastrectomy    Day of surgery  Sips of water evening after surgery.    Day 1 1 oz (30 ml) of Optisource every hour and 1 oz (30 ml) of water every 15 minutes as needed    Discharge   Begin diet progression as recommended in your packet     (Clear and Full liquids)      Gastric Bypass & Duodenal Switch    Day of Surgery Sips of water evening after surgery.    Post Op Day 1 1 oz (30 ml) water every hour progress to Optisource in the evening, 1 oz every hour and 1 oz of water every 15 minutes as needed    Post Op Day 2 1 oz (30 ml) of Optisource every hour 1 oz (30 ml) of water every 15 minutes as needed        Discharge Begin diet progression as recommended in your packet    (Clear and Full liquids)    Note: 1 oz = 2 Tablespoons          Gastric Bypass / Sleeve / Duodenal Switch Surgery Nutrition Plan: Post-Op Week 1 and 2    Clear and Full Liquid Diet   Water  Protein Shakes  Beef or chicken broth  “Bone Broth”  is higher in protein  Broth based soups (strained)  Low fat cream soups (strained)  Sugar free popsicles, Jell-O, pudding  Light yogurt (no chunks)  Light Greek yogurt (no chunks)    Soupy hot cereals (no sugar or butter)  Oatmeal, cream of wheat, grits  Skim or 1 % milk  Fairlife Milk, fat free  Unsweetened soymilk or almond milk  Black decaf coffee or decaf/green tea   Gatorade G2, PowerAde Zero  Sugar free drinks   Crystal light, Propel    No carbonated beverages    Points to Remember:    Your primary goal is to stay hydrated and increasing your protein intake to meet your needs.  Goal: 64 ounces of fluids daily (most foods are included at this stage)  Drink 1 oz. of liquid every 10 minutes  Taking in fluids frequently will help you stay well hydrated. Be sure to drink slowly. Take tiny sips, Do Not Gulp. You will not feel thirsty. You may need to set a timer or keep a log.  Avoid concentrated sources of sugar (regular Jell-O, pudding, popsicle, etc.) to prevent Dumping Syndrome. Symptoms of Dumping Syndrome include abdominal pain, gas, cramping, diarrhea, nausea, and sweating.  Sugar substitutes such as Splenda, Sweet & Low, Equal, Stevia, and Monk Fruit are acceptable.  Meet your protein needs daily:  You will need a protein supplement while on the clear and full liquid diet stage.  Protein is important for healing, to fight infection, to preserve muscle mass, and to promote healthy weight loss.   Start your supplements  Must be in a chewable or liquid form for 2 months after surgery.  Start your Multivitamin, Calcium Citrate, and Vitamin B12.    Sample Liquid Meal Plan    Time Food   8 to 9 a.m. 6 ounces of protein drink   9 to 10 a.m. 2 ounces of PowerAde Zero  4 ounces water   10 to 11 a.m.                                      4 ounces of protein drink  2 ounces sugar free Jell-O   11 a.m. to 12 noon 6 ounces Gatorade G2   12 noon to 1 p.m. 4 ounces of protein drink  2 ounces sugar free pudding   1 to 2 p.m.  3 ounces of sugar free popsicle   3 ounces of beef broth   2 to 3 p.m. 4 ounces of protein drink  2 ounces water   3 to 4 p.m.  2 ounces of yogurt  4 ounces sugar free drink   4 to 5 p.m. 4 ounces of PowerAde Zero  2 ounces of chicken broth   5 to 6 p.m. 3 ounces of strained soup  3 ounces water   6 to 7 p.m. 4 ounces of protein drink   *Remember to eat and drink everything very slowly, taking tiny sips and small bites of food. Not eating or drinking slowly may result in vomiting or even lead to perforation, leaks or other complications        Gastric Bypass / Sleeve / Duodenal Switch Surgery Nutrition Plan: Week 3, 4, and 5    Pureed Diet  Diet Overview:    You will need to blenderize most of your food.  Allowable foods include:  All foods from post-op liquid diet  Blenderized canned tuna or chicken packed in water  Pureed baked chicken, turkey, or fish (be careful of bones)  Boiled eggs or scrambled eggs, mashed or pureed  Blended canned beans or low fat refried beans  Low fat cottage cheese (small curd)  Pureed cooked or canned vegetables and fruit   Baby fruits and vegetables (stage 1 or 2)    Points to Remember:    Continue to stay hydrated and meet your protein needs.  Eat 6 small meals per day.   Listen to your body; it is ok if you are only tolerating 2-3 meals the first few days.  Limit meals to 4-5 tablespoons of food; otherwise, you may have nausea, diarrhea, vomiting, gas, and stomach pain.  Allow 30 minutes between eating and drinking. It is okay to take tiny sips with your meals to help keep the food moist and easy to swallow.  Eat slowly, taking at least 20-30 minutes to eat a meal or snack. Use a small bowl or plate, baby/children's spoon, or seafood fork to remind you to eat slowly.   Each bite of food should be pea sized and should be chewed 20-30 times each bite.  Set your fork or spoon down between bites can help you eat slower.  Eat protein foods first to get the protein your body needs prior to  feeling full  Include protein rich foods (meat, milk, cheese, yogurt, egg) with each meal and snack.      Sample Pureed Menu    Time Food   7 to 7:30 a.m.   Breakfast 1 scrambled egg   2 Tbsp. oatmeal   8 to 9 a.m. 8 ounces water   10 to 10:30 a.m.     Snack                                   8 ounces protein shake   11 a.m. to 12 Noon 8 ounces Crystal Light   12:30 to 1 PM        Lunch 3 Tbsp. blended Tuna salad  2 Tbsp. pureed fruit   1:30 to 2:30 p.m. 12 ounces Gatorade G2   3:30 to 4 p.m.        Snack ¼ cup low fat cottage cheese   4 to 5 p.m.  8 ounces water   5 to 5:30 p.m. 4 ounces Crystal Light   6 to 6:30 p.m.        Dinner 3 Tbsp. puree chicken  2 Tbsp. puree vegetables   7 to 8:30 8 ounces Gatorade G2   9 to 9:30                 Snack ¼ cup light Greek yogurt   10 to 11 8 ounces water     Gastric Bypass / Sleeve / Duodenal Switch Surgery Nutrition Plan: Week 6    Soft Diet  During this stage of the nutrition plan, you will be consuming solid foods that are well cooked. You may now begin to slowly introduce solid foods into your diet. It is important to avoid foods that are hard to digest because they may cause discomfort or blockage in the stomach pouch. It may help to introduce one new food at a time to help you identify foods you may not tolerate.    Foods to include:  Baked fish, chicken, and turkey (DO NOT eat any poultry skin)  Lean ground beef or turkey (needs to be soft)  Dried beans, peas, and lentils (well cooked)  Creamy peanut butter  Vegetables (canned or well cooked)  Canned fruit (unsweetened, in juice, or 'lite' version- do not drink the juice)    Allowed foods in small amounts:  Cereal, crackers, baked potato (no skin)  Toasted bread, saltine crackers, or Leidy toast    Points to Remember:  Eat 6 small meals per day.  No more than 6-8 Tbsp. or 3-4 ounces of food at one time.  Liquids in between meals. You may continue to take small sips with your meals.  Protein supplements may still be  used as a meal or snack when necessary.  Meet your protein and fluid needs to help prevent infection and dehydration.  Remember to continue eating slowly and chewing well.      Sample Meal Plan: Soft Diet    Meal Day 1 Day 2 Day 3   Breakfast +  Multivitamin  Vit B12 1 scrambled Egg  2 oz. Grits   1 egg omelet  1 oz. low fat cheese  ½ slice of toast High Protein oatmeal  (2 Tbsp. powder peanut butter, ½ cup skim milk, 2 Tbsp. oatmeal)    Snack +  Calcium Protein Shake   Protein Bar 6-8 ounces skim milk or Fairlife skim milk   Lunch    2 oz. Cottage cheese  1 oz. light canned peaches   2 oz. Tuna Salad  3-4 cooked baby carrots 2 oz. Egg salad  3 Crackers   Snack +  Calcium 2 oz. turkey cheese roll  ½ slice of toast 1 oz. Low fat cheese  ¼ cup applesauce 2-3 oz. low fat turkey chili   Dinner +  Multivitamin   2 oz. Baked Fish  1-2 oz. Mashed potato   2 oz. Lean ground beef  1 oz. Black beans  1 oz. Cantaloupe 2 oz. Baked Chicken  1-2 oz. mash sweet potato   Snack 2 oz. Light Greek yogurt  ? small banana 2 Tbsp. Peanut butter   2-3 saltine crackers Protein Shake     Meal Day 4 Day 5 Day 6   Breakfast +  Multivitamin  Vit B12 1 scrambled Egg  2 oz. Oatmeal 2 Tbsp. Peanut butter  ½ English muffin toasted   1 scrambled Egg  ¼ cup cereal + ¼ cup milk      Snack +  Calcium 2 oz. Three Bean Salad Protein shake 2 oz. Light Greek yogurt  ? small banana   Lunch    2 oz. Tuna Salad  1 oz. watermelon  3 Crackers 2 oz. Cottage Cheese  1 oz. canned peaches   ¼ cup Chicken Salad  1-2 oz. mash sweet potato   Snack +  Calcium Protein Shake   2 oz. Sugar free pudding + 2 Tbsp. protein powder Protein shake    Dinner +  Multivitamin 2 oz. ground Turkey  2 Tbsp. mixed vegetables   2 oz. Baked Fish  2 Tbsp. green beans 2 ounces baked Chicken  3-4 cooked baby carrots   Snack 1 oz. Low Fat cheese   3 oz. Light Greek yogurt 1 oz. Low fat cheese  2 Tbsp. applesauce     *Drink water and low calorie liquids between meals to prevent  dehydration  *Drink water and low calorie liquids between meals to prevent dehydration      Gastric Bypass / Sleeve / Duodenal Switch Surgery Nutrition Plan: Week 7    Regular Diet  Slowly increase the toughness and texture of foods as your body allows.  Vegetables will need to be cooked for the first few months, but not as soft.    Points to Remember:  Continue eating 6 meals daily with no more than 3-4 ounces of food per meal.  Gradually add new foods, to find which foods you tolerate best.  Allow 30 minutes between meals and liquids. Aim for 64 oz. of liquid daily.  Continue meeting your protein needs daily.  Continue taking Multivitamin, Calcium Citrate, and Vitamin B12 as recommended.  The following foods are common intolerances:  Nuts and seeds  Skins of potato, cucumber, eggplant, and apples  Membrane between sections of oranges and grapefruits  Celery, asparagus stems, string beans  Untoasted bread, soft or under cooked pasta and  rice  Steak and pork  Avoid foods high in calories, sugar, and fat such as:  Fried foods  Foods with gravy or sauce  Desserts  Sugar containing beverages (juice, soda, sweet tea)  Chips  Long Term Essentials:  Ask yourself “Should” rather than “Could” you eat something  Do not allow comfort foods in the home or where you will see them  Find comfort outside of food  Be careful what you read online  Stay active, aim for 150 minutes of physical activity per week   5 days of 30 minutes of walking, exercise, swimming  Avoid straws  No Carbonation  Monitor portion sizes       Protein sources  Food One Serving   Lean beef 1 ounce   Pork tenderloin 1 ounce   Chicken breast 1 ounce   Turkey breast 1 ounce   Luncheon meat (turkey breast, roast beef, lean ham) 1 ounce (be sure to read the label)   Creamy peanut butter 2 tablespoons   Egg 1 large   Egg beaters ¼ cup   Egg whites 2-3   Fish  1 ounce   Shrimp 5-6   Canned tuna or salmon 1 ounce or ¼ cup   Low-fat cheese 1 ounce (1” cube)    Low-fat or fat free cottage cheese ½ cup   Tofu ¼ cup   Beans   Canned or dry  (does not include green beans) ½ cup   Milk (skim or 1%) 1 cup   Greek yogurt ½ cup   Low sugar yogurt 1 cup   Fairlife milk (skim or 1%) ½ cup   Soymilk 1 cup   One serving provides 7 grams of protein   *Read the Nutrition Facts label on selected food packages to exact amounts of protein is provided by a product.     Measuring portion sizes of foods can be very beneficial to determining your total protein intake.  A deck of cards or the palm of your hand is approximately 3 oz. of protein rich food.  Use a measuring cup to measure serving sizes in this group are listed in cups. Note most of our protein comes from meats, poultry, fish, eggs, cheese, beans, milk, and dairy products.      Potential Problems and Suggested Dietary Modifications    Potential Problem Dietary Modifications   Constipation Meet fluid needs daily, aiming for 64+ ounces per day.  Increase fiber intake slowly from fruits, vegetables, beans, and whole grains pending on diet stage.  Consider adding a chewable or powdered fiber supplements such as Benefiber or Citrucel. Limit psyllium (Metamucil) based supplements.  Exercise daily.   Nausea and Vomiting Eat slowly, taking 20-30 minutes per meal.  Chew food well, 20-30 times per bite.  Avoid overeating. Stay within recommended food volume guidelines.  Separate eating and drinking by 30-60 min.  Follow diet progression as instructed. Do not advance to solid food until instructed.  Meet fluid needs daily, aiming for 64+ ounces per day.    Diarrhea Limit sugar intake to no more than 15 grams per meal.  Avoid sugar alcohols (i.e. sorbitol, mannitol, xylitol).  Avoid high fat and greasy foods.  Try eliminating dairy as lactose intolerance sometimes occurs after bariatric surgery.   Difficulty Swallowing Eat slowly, taking 20-30 minutes per meal.  Chew food well, 20-30 times per bite or food is liquefied in mouth.  Avoid  overeating. Stay within recommended food volume guidelines.  Avoid tough or rubbery meats, crunchy vegetables, and sticky foods like untoasted bread.   Burping Eat slowly, taking 20-30 minutes per meal.  Chew foods longer to avoid swallowing air.  Avoid carbonated beverages, chewing gum, and sipping through straws or sport bottles tops.         Vitamin and Mineral Needs    Vitamin deficiencies are common among patients that do not take their vitamins. The only way to tell if you are absorbing your vitamins properly is getting your lab work completed regularly. (See [facility] packet for recommended labs)    Multivitamin  Bariatric vitamins are preferred.  The reasons include:  Formulated based on research to meet your specific needs after surgery.  Higher quality and more likely to contain what is on the label compared to an over the counter vitamin.  Easy to read directions (you take 1 serving as directed)  Better absorbed if taken in 2-3 doses spread throughout the day.    If you choose an over the counter multivitamin, remember:  Each serving must contain 18 mg iron, 400 mcg folic acid, thiamine, and zinc  Additional thiamine and zinc will be needed  Take 2 SERVINGS per day (if 1 serving = 2 pills, you will need 4 pills total)  Look for regular adult. No 50+, silver, men, or gummy vitamins.  Calcium Citrate  7018-8450 mg daily of Calcium Citrate.  Split calcium into 2-3 doses (400-600 mg) throughout the day.  Read Serving Size carefully; If 1 serving = 2 pills, you may need 6 pills per day.  Take at least 2 hours before or after other vitamins with iron  May also be called “Tricalcium Citrate”  Beware: Calcium Carbonate is the most common form found in stores, but is poorly absorbed after surgery!  Vitamin B12  Daily sublingual tablet or liquid 500-1000 mcg/day, dissolves under your tongue  Monthly intramuscular injections are adequate substitutes.  Vitamin D3:    Total daily needs are 3000 IU.    Add  the amount provided by your multivitamin and calcium supplements.  An additional Vitamin D3 may be required based on your labs and the vitamins you choose.    Combination bariatric supplements: multivitamin and calcium  Take recommended dose spread throughout the day  If 4 tablets are recommended daily, take 1 at a time to increase absorption  May need additional supplements; such as calcium citrate, iron, and thiamine    Multivitamin and Calcium Supplement Suggestions*   Multivitamins Dose Location   Bariatric Advantage Multi EA Chewable  2 per day [email or web address]    Bariatric Advantage Ultra Multi Capsules  (with or without iron) 3 per day [email or web address]    Bariatric Advantage Chewy Bites  2 per day  Separate iron needed [email or web address]    Celebrate Multi-Complete 2 per day [email or web address]    Celebrate Multivitamins Chewable 2 per day  Separate iron needed [email or web address]    Opurity 1 per day [email or web address]    Calcium Citrate Dose Location   Bariatric Advantage Calcium Citrate Chewable 3 per day [email or web address]    Bariatric Advantage Chewy Bites   (250 mg or 500 mg) 500 mg- 3 per day  250 mg-5 per day [email or web address]    Bluebonnet Liquid CALCIUM Magnesium Citrate Plus Vitamin D3 2 Tbsp. per day Health food stores   Building Blocks Calcium Citrate 2 per day  (1200 mg) [email or web address]    Calcet Citrate Creamy Bites 3 per day  (1500 mg) Drugstores   Citracal Petites Tablets 6 tablets per day  (1200 mg) Drugstores   Celebrate Calcium Plus Chewable  (250 mg or 500 mg) 500 mg- 3 per day  250 mg-5 per day [email or web address]    Celebrate Calcium Soft Chews 3 per day  (1500 mg) [email or web address]    Opurity Calcium Citrate Plus 4 per day  (1200 mg) [email or web address]    [place] Light Calcium Citrate Chew 4 per day  (1300 mg) Skift food Panjo     All-in-One Multivitamin and Calcium Dose Location   [facility]  Bariatric Vitamin &  Mineral 4 per day; Additional 500 mg Calcium Citrate needed ethority   Bariatric Fusion Vitamin & Mineral 4 per day [email or web address]    Bariatric Choice All-in-one 4 per day [email or web address]    *Please note: we do not require the use of any particular vendor or product. This is not meant to be an exhaustive list since other products may meet your need. Please consult with your medical team if you have any questions.     Protein Supplement Suggestions     Drinks Kcal Protein Sugar Fat Where To Buy   Atkins Lift Protein Drink 90 20 0 0 Grocery Stores   Bariatric Advantage Meal Replacements, 1 packet 160 27 0.5 2 [email or web address]  [phone]   HEALTH -discount code   Body Fortress Whey Isolate Protein, 1 scoop 140 30 3 4 [employer]   Boost Glucose Control, 8 oz. 190 16 4 7 Grocery Stores   Quest Protein Powder, Vanilla 1 scoop 100 22 <1 0 Grocery Stores  Note: Some flavors contain sugar alcohols   Celebrate Meal Replacement, 2 scoops 150 27 4 0.5 [email or web address]  [phone]     OhYeah! Shake, 14 oz. 220 32 3 9 Grocery Stores   EAS AdvantEDGE Carb Control, 11 oz. 100 17 0 3 Grocery Stores   Nature's Best Isopure Zero Carb, 20 oz.  160 40 0 0 [employer]NC, The Vitamin Shoppe   Muscle Milk Genuine, Vanilla Creme, 14 ounce 160 25 0 5 Grocery Stores   Optisource, 8 oz. 200 24 0 6 www.Building Blocks CRE or  [phone] (Nestle)   Premier Protein 160 30 1 3 Grocery Stores     Slim Fast Advanced Nutrition, 11 oz. 180 20 1 9 Grocery Stores   Syntrax [place], 1 scoop 100 23 0 0 wwwSouthern Po Boys  [phone]   Unjury Powder, 1 scoop or 1 packet 80 20 0 0 www.[email or web address]   [phone]   Beneprotein powder (unflavored), 1 scoop 25 6 0 0 www.Building Blocks CRE [phone] ([place]le)   * If you are adding a protein powder to Milk, remember to add the sugar and protein content to what is provided by the shake.   Skim Milk, 8 oz. 90 8 12 0 Regular of lactose free   [facility], Skim Milk, 8  80 13 6 0     Soymilk, 8 oz. 80 7 1 4 Unsweetened   [place] Milk, 8 oz. 30-40 1 0 2.5 Unsweetened   Please note: we do not require the use of any particular vendor or product. This is not meant to be an exhaustive list since other products may meet your need. Please consult with your medical team if you have any questions.     Multivitamin  -High potency formula containing at -least 100-200% of most vitamins and minerals  -Take chewable/liquid for 2 months  -Iron: total of 36 mg/d (divide dose)  -Folic Acid: 400-1000 mcg/d  -Selenium  -Zinc, Copper    Formulas must contain:    Calcium Supplement  -6727-7280 mg calcium citrate   (Divide in doses of 500-600 mg)  -400-800 IU vitamin D  -Take chewable/liquid for 2 months  -Separate from iron by 2 hours    Protein Shake Ideas  PB&J  4 oz. strawberry light Greek yogurt  4 oz. milk  1 scoop mary jane. protein powder  1 Teaspoon powdered peanut butter    Vanilla Latte (or Mocha)  8 ounces milk  1 Teaspoon Decaf Instant Coffee  1 pack stevia or Splenda  1 scoop Vanilla protein powder  (Or chocolate protein for Mocha)    *Banana  4 ounces low fat yogurt  4 ounces milk  1 small banana  1 scoop vanilla protein powder  Blend all ingredients until smooth   *Add 1-2 Teaspoon powder peanut butter to make peanut butter banana shake    Geovanny Spice   8 ounces water  1 scoop vanilla protein powder  1 Geovanny Tea bag (herbal tea)  1 packet Splenda   Heat water, then steep Geovanny tea for 5 minutes, allow to cool and add protein powder    Mint Chocolate  Recipes adapted from Marisol.  Unless specifically stated, milk is per taste preference.  Examples include Skim Milk, Unsweetnened Soy Milk, Fairlife Milk, [place] Milk, and Skim Lactaid Milk.   Protein content depends on protein powder and choice of milk.  No canned coconut milk.  Mix ingredients in shaker cup or  unless otherwise stated.  May substitute ready-made protein drinks in place of milk and protein powder for most recipes.  8 ounces milk  1 scoop  chocolate protein powder  1-2 drops of peppermint extract    Hot Chocolate  8 ounces milk  1 scoop chocolate protein powder  Heat milk until luke-warm (do not overheat) and then mix protein powder    Strawberry Lemonade  1 scoop strawberry protein powder  ½ Lemonade Crystal Light single serve packet  8 ounces water    Pumpkin Spice  8 ounces of milk  1 scoop vanilla protein powder  1 Tablespoon Canned Pumpkin  ¼ teaspoon pumpkin spice (or to taste)  2 packets Splenda    Peach   4 ounces light Strawberry Greek yogurt  4 ounces milk  1 scoop vanilla protein powder  ¼-cup canned/frozen peaches (packed in juice-strained)  Blend all ingredients until smooth    Creamsicle  8 ounces water  ½ packet orange crystal light  1 scoop Vanilla protein powder

## 2024-08-10 NOTE — ANESTHESIA TIME REPORT
Anesthesia Start and Stop Event Times       Date Time Event    8/9/2024 1512 Anesthesia Start     1513 Ready for Procedure     1711 Anesthesia Stop          Responsible Staff  08/09/24      Name Role Begin End    Johan Cross M.D. Anesth 1512 1711          Overtime Reason:  no overtime (within assigned shift)    Comments:

## 2024-08-10 NOTE — PROGRESS NOTES
Marlana Marie Behm has been provided discharge instructions, to include follow up care, home medications, and activity/diet reviewed. Understanding verbalized. IV removed. Catheter tip intact, bleeding controlled. Arm band removed. Medications given from pharmacy. Pt ride present. Pt out of DCL at this time with personal belongings.

## 2024-08-10 NOTE — CARE PLAN
Problem: Pain - Standard  Goal: Alleviation of pain or a reduction in pain to the patient’s comfort goal  Outcome: Progressing     The patient is Stable - Low risk of patient condition declining or worsening    Shift Goals  Clinical Goals: pain managment    Progress made toward(s) clinical / shift goals:  medicated for pain per MAR    Patient is not progressing towards the following goals:

## 2024-08-10 NOTE — ANESTHESIA POSTPROCEDURE EVALUATION
Patient: Marlana Marie Behm    Procedure Summary       Date: 08/09/24 Room / Location: [place] [id number] / SURGERY [facility]    Anesthesia Start: 1512 Anesthesia Stop: 1711    Procedure: ROBOTIC SLEEVE GASTRECTOMY, HIATAL HERNIA REPAIR (Abdomen) Diagnosis: (morbid obesity, hiatal hernia)    Surgeons: Gavin Rogel M.D. Responsible Provider: Johan Cross M.D.    Anesthesia Type: general ASA Status: 2            Final Anesthesia Type: general  Last vitals  BP   Blood Pressure: (!) 149/71    Temp   36.3 °C (97.3 °F)    Pulse   95   Resp   12    SpO2   100 %      Anesthesia Post Evaluation    Patient location during evaluation: PACU  Patient participation: complete - patient participated  Level of consciousness: awake  Pain score: 2    Airway patency: patent  Anesthetic complications: no  Cardiovascular status: adequate and hypertensive  Respiratory status: acceptable  Hydration status: stable    PONV: controlled          No notable events documented.     Nurse Pain Score: 0 (NPRS)

## 2024-08-10 NOTE — PROGRESS NOTES
"[facility]  Progress Note      POD #1, s/p robotic sleeve gastrectomy and posterior hiatal cruroplasty.    Subjective:     Reports feeling reasonably well. Notes mild to moderate amount of incisional/abdominal pain (most notably at the RUQ incision), epigastric/substernal chest pain & tightness, mild dysphagia and early satiety. Otherwise, no recent history of fevers/chills, vomiting/hematemesis, odynophagia, CP/SOB, bloating/belching, worsening abdominal pain, dysuria/hematuria, BRBPR/melena, or constipation/diarrhea. No other symptoms or complaints at this time. Tolerating sips of clear liquids and water, ambulating in the hallways w/o difficulties and voiding spontaneously.    Objective:    /53   Pulse (!) 50   Temp 36.2 °C (97.1 °F) (Temporal)   Resp 16   Ht 1.676 m (5' 6\")   Wt 105 kg (232 lb 9.4 oz)   SpO2 98%     I/O last 3 completed shifts:  In: 970 [P.O.:240; I.V.:730]  Out: 25     Current Facility-Administered Medications   Medication Dose    ROPINIRole (Requip) tablet 0.25 mg  0.25 mg    lactated ringers infusion      Pharmacy Consult Request ...Pain Management Review 1 Each  1 Each    acetaminophen (Tylenol) tablet 1,000 mg  1,000 mg    Followed by    [START ON 8/14/2024] acetaminophen (Tylenol) tablet 1,000 mg  1,000 mg    ketorolac (Toradol) 15 MG/ML injection 15 mg  15 mg    oxyCODONE (Roxicodone) oral solution 5 mg  5 mg    Or    oxyCODONE (Roxicodone) oral solution 10 mg  10 mg    Or    HYDROmorphone (Dilaudid) injection 0.5 mg  0.5 mg    ondansetron (Zofran) syringe/vial injection 4 mg  4 mg    promethazine (Phenergan) suppository 25 mg  25 mg    simethicone (Mylicon) chewable tablet 125 mg  125 mg    benzocaine-menthol (Cepacol) lozenge 1 Lozenge  1 Lozenge    heparin injection 5,000 Units  5,000 Units    labetalol (Normodyne/Trandate) injection 10 mg  10 mg    pantoprazole (Protonix) injection 40 mg  40 mg    gabapentin (Neurontin) 250 MG/5ML 300 mg  300 mg    " methocarbamol (Robaxin) 1,000 mg in  mL IVPB  1,000 mg    dexamethasone (Decadron) injection 4 mg  4 mg    polyethylene glycol/lytes (Miralax) Packet 1 Packet  1 Packet    metoclopramide (Reglan) injection 10 mg  10 mg    losartan (Cozaar) tablet 50 mg  50 mg    And    hydroCHLOROthiazide tablet 12.5 mg  12.5 mg     Physical Exam:     Gen - comfortable, NAD, A&O x 3  HEENT - anicteric, PERRLA  CV - regular rate and rhythm  Abd - soft, + min TTP @ epigastrium and RUQ, no rebound/guarding, no distention, no palp masses or bulges  Inc - all lap incisions are C/D/I - no evidence of infection or bulges; + min bruising at all incisions  Ext - no clubbing, cyanosis or edema bilaterally  Skin - no rashes/lesions, no open wounds, no jaundice    Labs:    Recent Labs     08/08/24  0804 08/10/24  0149   WBC 4.9 10.8   RBC 4.74 4.73   HEMOGLOBIN 13.7 13.5   HEMATOCRIT 41.8 41.6   MCV 88.2 87.9   MCH 28.9 28.5   MCHC 32.8 32.5   RDW 40.7 40.1   PLATELETCT 290 254   MPV 8.7* 8.4*     Recent Labs     08/08/24  0804 08/10/24  0149   SODIUM 137 137   POTASSIUM 3.7 4.8   CHLORIDE 98 99   CO2 26 21   GLUCOSE 81 130*   BUN 20 16     Imaging:    No results found.    Assessment/Plan:    Obesity, class II (BMI of 37.3 kg/m2)  HTN and GERD  Small, sliding hiatal hernia (type I)    Now, POD #1, s/p robotic sleeve gastrectomy and posterior hiatal cruroplasty.    Postoperative recovery is progressing as expected. Pain and nausea are well controlled. Tolerating sips of clear liquids and water, ambulating in the hallways w/o difficulties and voiding spontaneously.    Will continue to encourage increased PO intake + clear protein shakes throughout the day. In addition, will encourage ambulation in the hallways and use of ICS while resting in bed.     Patient may be stable for discharge home later today or tomorrow AM, with close follow up in the Surgery clinic (appointment has been scheduled already), if continues to recover well.    Thank  you for giving us this opportunity to care for this patient.    Gavin Rogel MD MPH FACS [facility]  Bariatric and Gastroesophageal Surgery  [facility]  Clinical Assistant Professor of Surgery  [facility]

## 2024-08-12 LAB — PATHOLOGY CONSULT NOTE: NORMAL

## 2024-09-07 ENCOUNTER — OFFICE VISIT (OUTPATIENT)
Dept: URGENT CARE | Facility: PHYSICIAN GROUP | Age: 49
End: 2024-09-07
Payer: COMMERCIAL

## 2024-09-07 VITALS
HEART RATE: 77 BPM | TEMPERATURE: 97.6 F | OXYGEN SATURATION: 100 % | RESPIRATION RATE: 16 BRPM | WEIGHT: 217 LBS | HEIGHT: 66 IN | BODY MASS INDEX: 34.87 KG/M2 | DIASTOLIC BLOOD PRESSURE: 80 MMHG | SYSTOLIC BLOOD PRESSURE: 120 MMHG

## 2024-09-07 DIAGNOSIS — L30.9 DERMATITIS: ICD-10-CM

## 2024-09-07 PROCEDURE — 1126F AMNT PAIN NOTED NONE PRSNT: CPT

## 2024-09-07 PROCEDURE — 99203 OFFICE O/P NEW LOW 30 MIN: CPT

## 2024-09-07 PROCEDURE — 3074F SYST BP LT 130 MM HG: CPT

## 2024-09-07 PROCEDURE — 3079F DIAST BP 80-89 MM HG: CPT

## 2024-09-07 RX ORDER — PREDNISONE 20 MG/1
40 TABLET ORAL DAILY
Qty: 10 TABLET | Refills: 0 | Status: SHIPPED | OUTPATIENT
Start: 2024-09-07 | End: 2024-09-12

## 2024-09-07 RX ORDER — HYDROXYZINE HYDROCHLORIDE 25 MG/1
25 TABLET, FILM COATED ORAL
Qty: 30 TABLET | Refills: 1 | Status: SHIPPED | OUTPATIENT
Start: 2024-09-07 | End: 2024-10-07

## 2024-09-07 ASSESSMENT — ENCOUNTER SYMPTOMS
FEVER: 0
MYALGIAS: 0
VOMITING: 0
SORE THROAT: 0
ABDOMINAL PAIN: 0
DIARRHEA: 0
HEADACHES: 0
CHILLS: 0
SHORTNESS OF BREATH: 0
COUGH: 0
NAUSEA: 0

## 2024-09-07 ASSESSMENT — PAIN SCALES - GENERAL: PAINLEVEL: NO PAIN

## 2024-09-07 ASSESSMENT — FIBROSIS 4 INDEX: FIB4 SCORE: 0.75

## 2024-09-07 NOTE — PROGRESS NOTES
"Subjective:   Marlana Marie Behm is a 48 y.o. female who presents for Rash (Bilateral legs 08/09/2024)      Rash  This is a new problem. Episode onset: 8/9/2024. The problem has been gradually worsening since onset. The affected locations include the abdomen, left upper leg and right upper leg. The rash is characterized by itchiness. Associated with: Recent surgery on 8/9/2024 and had reaction to the tape. Pertinent negatives include no congestion, cough, diarrhea, fever, shortness of breath, sore throat or vomiting. Past treatments include topical steroids, anti-itch cream and antihistamine (Triamcinolone/hydrocortisone/Benadryl cream/nondrowsy antihistamine). The treatment provided no relief. Her past medical history is significant for allergies. There is no history of asthma or eczema.       Review of Systems   Constitutional:  Negative for chills, fever and malaise/fatigue.   HENT:  Negative for congestion, ear pain, hearing loss and sore throat.    Respiratory:  Negative for cough and shortness of breath.    Cardiovascular:  Negative for chest pain.   Gastrointestinal:  Negative for abdominal pain, diarrhea, nausea and vomiting.   Genitourinary:  Negative for dysuria.   Musculoskeletal:  Negative for myalgias.   Skin:  Positive for itching and rash.   Neurological:  Negative for headaches.       Medications, Allergies, and current problem list reviewed today in Epic.     Objective:     /80 (BP Location: Right arm, Patient Position: Sitting, BP Cuff Size: Adult)   Pulse 77   Temp 36.4 °C (97.6 °F) (Temporal)   Resp 16   Ht 1.676 m (5' 6\")   Wt 98.4 kg (217 lb)   SpO2 100%     Physical Exam  Vitals and nursing note reviewed.   Constitutional:       General: She is not in acute distress.     Appearance: Normal appearance. She is not ill-appearing.   HENT:      Head: Normocephalic and atraumatic.      Right Ear: Tympanic membrane normal.      Left Ear: Tympanic membrane normal.      Nose: Nose normal. "      Mouth/Throat:      Mouth: Mucous membranes are moist.   Eyes:      Conjunctiva/sclera: Conjunctivae normal.   Cardiovascular:      Rate and Rhythm: Normal rate.      Heart sounds: Normal heart sounds.   Pulmonary:      Effort: Pulmonary effort is normal.   Abdominal:      General: Abdomen is flat.      Palpations: Abdomen is soft.   Musculoskeletal:         General: Normal range of motion.      Cervical back: Normal range of motion.   Skin:     General: Skin is warm and dry.      Capillary Refill: Capillary refill takes less than 2 seconds.      Findings: Rash present. Rash is macular and papular.             Comments: Patient has diffuse rash to lower abdomen after surgery that is now spread diffusely to bilateral lower extremities   Neurological:      Mental Status: She is alert and oriented to person, place, and time.   Psychiatric:         Mood and Affect: Mood normal.         Behavior: Behavior normal.         Assessment/Plan:       1. Dermatitis  predniSONE (DELTASONE) 20 MG Tab    hydrOXYzine HCl (ATARAX) 25 MG Tab        After assessment it does appear the patient is a reaction to what was used during her recent surgery.  Patient does have follow-up with her surgeon this week.  Patient has used multiple medications with no relief this includes triamcinolone, hydrocortisone, Benadryl cream, and nondrowsy antihistamine.  Patient at this time was provided a 5-day course of prednisone and given hydroxyzine for nighttime use to help with symptoms.  Patient instructed to take these as prescribed.  Patient instructed to monitor for any worsening signs and symptoms of any other concerns patient was instructed to return to urgent care or emergency room for further management.    Differential diagnosis, natural history, and supportive care discussed. We also reviewed side effects of medication including allergic response, GI upset, tendon injury, rash, sedation etc. Patient and/or guardian voices understanding.       Advised the patient to follow-up with the primary care physician for recheck, reevaluation, and consideration of further management.    I personally reviewed prior external notes and test results pertinent to today's visit as well as additional imaging and testing completed in clinic today.     Please note that this dictation was created using voice recognition software. I have made every reasonable attempt to correct obvious errors, but I expect that there are errors of grammar and possibly content that I did not discover before finalizing the note.    This note was electronically signed by ROSA MARIA Xiao

## 2024-11-30 ENCOUNTER — HOSPITAL ENCOUNTER (OUTPATIENT)
Dept: LAB | Facility: MEDICAL CENTER | Age: 49
End: 2024-11-30
Attending: SURGERY
Payer: COMMERCIAL

## 2024-11-30 LAB
25(OH)D3 SERPL-MCNC: 44 NG/ML (ref 30–100)
ALBUMIN SERPL BCP-MCNC: 4.8 G/DL (ref 3.2–4.9)
ALBUMIN/GLOB SERPL: 1.8 G/DL
ALP SERPL-CCNC: 113 U/L (ref 30–99)
ALT SERPL-CCNC: 27 U/L (ref 2–50)
ANION GAP SERPL CALC-SCNC: 11 MMOL/L (ref 7–16)
AST SERPL-CCNC: 20 U/L (ref 12–45)
BASOPHILS # BLD AUTO: 0.4 % (ref 0–1.8)
BASOPHILS # BLD: 0.02 K/UL (ref 0–0.12)
BILIRUB SERPL-MCNC: 0.4 MG/DL (ref 0.1–1.5)
BUN SERPL-MCNC: 13 MG/DL (ref 8–22)
CALCIUM ALBUM COR SERPL-MCNC: 9.1 MG/DL (ref 8.5–10.5)
CALCIUM SERPL-MCNC: 9.7 MG/DL (ref 8.5–10.5)
CHLORIDE SERPL-SCNC: 106 MMOL/L (ref 96–112)
CHOLEST SERPL-MCNC: 222 MG/DL (ref 100–199)
CO2 SERPL-SCNC: 26 MMOL/L (ref 20–33)
CREAT SERPL-MCNC: 0.65 MG/DL (ref 0.5–1.4)
EOSINOPHIL # BLD AUTO: 0.09 K/UL (ref 0–0.51)
EOSINOPHIL NFR BLD: 1.9 % (ref 0–6.9)
ERYTHROCYTE [DISTWIDTH] IN BLOOD BY AUTOMATED COUNT: 41.5 FL (ref 35.9–50)
EST. AVERAGE GLUCOSE BLD GHB EST-MCNC: 105 MG/DL
FERRITIN SERPL-MCNC: 119 NG/ML (ref 10–291)
FOLATE SERPL-MCNC: 17.5 NG/ML
GFR SERPLBLD CREATININE-BSD FMLA CKD-EPI: 108 ML/MIN/1.73 M 2
GLOBULIN SER CALC-MCNC: 2.7 G/DL (ref 1.9–3.5)
GLUCOSE SERPL-MCNC: 86 MG/DL (ref 65–99)
HBA1C MFR BLD: 5.3 % (ref 4–5.6)
HCT VFR BLD AUTO: 43.8 % (ref 37–47)
HDLC SERPL-MCNC: 44 MG/DL
HGB BLD-MCNC: 14.4 G/DL (ref 12–16)
IMM GRANULOCYTES # BLD AUTO: 0.01 K/UL (ref 0–0.11)
IMM GRANULOCYTES NFR BLD AUTO: 0.2 % (ref 0–0.9)
IRON SERPL-MCNC: 102 UG/DL (ref 40–170)
LDLC SERPL CALC-MCNC: 155 MG/DL
LYMPHOCYTES # BLD AUTO: 2.15 K/UL (ref 1–4.8)
LYMPHOCYTES NFR BLD: 45.3 % (ref 22–41)
MCH RBC QN AUTO: 29.1 PG (ref 27–33)
MCHC RBC AUTO-ENTMCNC: 32.9 G/DL (ref 32.2–35.5)
MCV RBC AUTO: 88.7 FL (ref 81.4–97.8)
MONOCYTES # BLD AUTO: 0.29 K/UL (ref 0–0.85)
MONOCYTES NFR BLD AUTO: 6.1 % (ref 0–13.4)
NEUTROPHILS # BLD AUTO: 2.19 K/UL (ref 1.82–7.42)
NEUTROPHILS NFR BLD: 46.1 % (ref 44–72)
NRBC # BLD AUTO: 0 K/UL
NRBC BLD-RTO: 0 /100 WBC (ref 0–0.2)
PLATELET # BLD AUTO: 305 K/UL (ref 164–446)
PMV BLD AUTO: 9.1 FL (ref 9–12.9)
POTASSIUM SERPL-SCNC: 4 MMOL/L (ref 3.6–5.5)
PREALB SERPL-MCNC: 24.5 MG/DL (ref 18–38)
PROT SERPL-MCNC: 7.5 G/DL (ref 6–8.2)
RBC # BLD AUTO: 4.94 M/UL (ref 4.2–5.4)
SODIUM SERPL-SCNC: 143 MMOL/L (ref 135–145)
TRANSFERRIN SERPL-MCNC: 209 MG/DL (ref 200–370)
TRIGL SERPL-MCNC: 113 MG/DL (ref 0–149)
VIT B12 SERPL-MCNC: 2032 PG/ML (ref 211–911)
WBC # BLD AUTO: 4.8 K/UL (ref 4.8–10.8)

## 2024-11-30 PROCEDURE — 82746 ASSAY OF FOLIC ACID SERUM: CPT

## 2024-11-30 PROCEDURE — 82728 ASSAY OF FERRITIN: CPT

## 2024-11-30 PROCEDURE — 84466 ASSAY OF TRANSFERRIN: CPT

## 2024-11-30 PROCEDURE — 84425 ASSAY OF VITAMIN B-1: CPT

## 2024-11-30 PROCEDURE — 36415 COLL VENOUS BLD VENIPUNCTURE: CPT

## 2024-11-30 PROCEDURE — 83540 ASSAY OF IRON: CPT

## 2024-11-30 PROCEDURE — 84207 ASSAY OF VITAMIN B-6: CPT

## 2024-11-30 PROCEDURE — 83036 HEMOGLOBIN GLYCOSYLATED A1C: CPT

## 2024-11-30 PROCEDURE — 82607 VITAMIN B-12: CPT

## 2024-11-30 PROCEDURE — 84252 ASSAY OF VITAMIN B-2: CPT

## 2024-11-30 PROCEDURE — 84630 ASSAY OF ZINC: CPT

## 2024-11-30 PROCEDURE — 85025 COMPLETE CBC W/AUTO DIFF WBC: CPT

## 2024-11-30 PROCEDURE — 84134 ASSAY OF PREALBUMIN: CPT

## 2024-11-30 PROCEDURE — 80053 COMPREHEN METABOLIC PANEL: CPT

## 2024-11-30 PROCEDURE — 82306 VITAMIN D 25 HYDROXY: CPT

## 2024-11-30 PROCEDURE — 80061 LIPID PANEL: CPT

## 2024-12-02 LAB — ZINC SERPL-MCNC: 92.8 UG/DL (ref 60–120)

## 2024-12-03 LAB
VIT B1 BLD-MCNC: 143 NMOL/L (ref 70–180)
VIT B6 SERPL-MCNC: 143.9 NMOL/L (ref 20–125)

## 2024-12-04 ENCOUNTER — OFFICE VISIT (OUTPATIENT)
Dept: SURGICAL ONCOLOGY | Facility: MEDICAL CENTER | Age: 49
End: 2024-12-04
Payer: COMMERCIAL

## 2024-12-04 VITALS
TEMPERATURE: 97.5 F | HEART RATE: 63 BPM | WEIGHT: 196 LBS | DIASTOLIC BLOOD PRESSURE: 80 MMHG | OXYGEN SATURATION: 98 % | BODY MASS INDEX: 31.64 KG/M2 | SYSTOLIC BLOOD PRESSURE: 138 MMHG

## 2024-12-04 DIAGNOSIS — E66.9 OBESITY (BMI 30-39.9): ICD-10-CM

## 2024-12-04 DIAGNOSIS — Z98.84 S/P LAPAROSCOPIC SLEEVE GASTRECTOMY: ICD-10-CM

## 2024-12-04 PROCEDURE — 99213 OFFICE O/P EST LOW 20 MIN: CPT | Performed by: SURGERY

## 2024-12-04 PROCEDURE — 3075F SYST BP GE 130 - 139MM HG: CPT | Performed by: SURGERY

## 2024-12-04 PROCEDURE — 3079F DIAST BP 80-89 MM HG: CPT | Performed by: SURGERY

## 2024-12-04 RX ORDER — URSODIOL 300 MG/1
300 CAPSULE ORAL 2 TIMES DAILY
COMMUNITY

## 2024-12-04 ASSESSMENT — FIBROSIS 4 INDEX: FIB4 SCORE: 0.61

## 2024-12-04 NOTE — PROGRESS NOTES
DEPARTMENT OF SURGERY  BARIATRIC AND GASTROESOPHAGEAL SURGERY      DATE OF SERVICE:  12/4/2024    REASON FOR VISIT:  Follow Up Evaluation / Establishing car    HISTORY OF PRESENT ILLNESS:  Ms. Behm is presenting to our clinic for a routine postoperative evaluation (and to establish care here at [facility]) as she continues to recover ~ four months post robotic sleeve gastrectomy with hiatal hernia repair. She is experiencing the following symptoms: occasional heartburn with regurgitation. Of note, these symptoms persist despite being on daily omeprazole, however, they remain sporadic and occasional. Otherwise, no recent history of fevers/chills, jaundice/icterus, odynophagia, hematemesis, bloating/distention, worsening abdominal pain, dysuria/hematuria, BRBPR/melena, or diarrhea. All abdominal incisions have healed well, without evidence of infection or bulges. Readmissions, interventions, or reoperations since primary operation: No.    The patient has lost 56 lbs since surgery. Current Body mass index is 31.64 kg/m².  The patient is meeting fluid requirements.   The patient is meeting protein requirements.  The patient is consuming bariatric vitamin/mineral supplements.  The patient is not using tobacco/nicotine.    Past Medical History:   Diagnosis Date    Heart burn     Hiatus hernia syndrome     High cholesterol     Hypertension     Migraines     Pneumonia     Psychiatric problem     post partum depression    Urinary incontinence     stress incontinence     Past Surgical History:   Procedure Laterality Date    OH LAP GABRIEL RESTRICT PROC LONGITUDINAL GAS*  8/9/2024    Procedure: ROBOTIC SLEEVE GASTRECTOMY, HIATAL HERNIA REPAIR;  Surgeon: Gavin Rogel M.D.;  Location: SURGERY [facility];  Service: Gen Robotic    PELVISCOPY  06/28/2011    Performed by HORACIO REYNOLDS at SURGERY SAME DAY [facility]    LYSIS ADHESIONS GYN  06/28/2011    Performed by HORACIO REYNOLDS at SURGERY SAME DAY [facility]     GYN SURGERY  01/01/2010    ovaries removed    GYN SURGERY  01/01/2008    hysterectomy (uterus removed)    TONSILLECTOMY  1982    HYSTERECTOMY, TOTAL ABDOMINAL       No Known Allergies    Current Outpatient Medications   Medication Sig Dispense Refill    omeprazole (PRILOSEC) 20 MG delayed-release capsule TAKE 1 CAPSULE BY MOUTH ONCE DAILY IN THE MORNING 30 MINUTES BEFORE MORNING MEAL      ursodiol (ACTIGALL) 300 MG Cap Take 300 mg by mouth 2 times a day.      methocarbamol (ROBAXIN) 750 MG Tab Take 1 Tablet by mouth 3 times a day as needed (Abdominal or inguinal pain). 90 Tablet 1    acetaminophen (TYLENOL) 500 MG Tab Take 1,000 mg by mouth every 6 hours as needed for Moderate Pain.      ROPINIRole (REQUIP) 0.25 MG Tab Take 0.25 mg by mouth 3 times a day as needed (Restless leg).      losartan-hydrochlorothiazide (HYZAAR) 50-12.5 MG per tablet Take 1 Tablet by mouth every day.    (Patient not taking: Reported on 12/4/2024)       No current facility-administered medications for this visit.     PHYSICAL EXAM:  /80 (BP Location: Right arm, Patient Position: Sitting, BP Cuff Size: Large adult long)   Pulse 63   Temp 36.4 °C (97.5 °F) (Temporal)   Wt 88.9 kg (196 lb)   SpO2 98%   BMI 31.64 kg/m²   Body mass index is 31.64 kg/m².    General: healthy, alert, oriented, no distress, relaxed, cooperative  Abdomen: soft, no TTP, no distention, no palpable bulges or masses  Incisions: clean, dry and intact; healed well, no evidence of infection or bulges  Extremities: warm and well-perfused, without clubbing, cyanosis or edema bilaterally  Skin: no rashes, no ecchymoses, no petechiae, no jaundice, no open wounds  Psych: good judgement, mood and affect are appropriate, excellent hygiene    ASSESSMENT:  Ms. Behm returns to our clinic today for a four month postoperative visit after undergoing her robotic sleeve gastrectomy with hiatal hernia repair. She has done very well after surgery and has voiced approval of this  procedure and its outcome thus far. We discussed the expected postoperative course and potential long-term complications of bariatric surgery (such as vitamin/nutrient deficiencies, heartburn/reflux, and inadequate weight loss/weight regain). In addition, the importance of following recommended dietary changes and participating in regular exercise were highly emphasized. We will schedule the next appointment as part of our scheduled follow up series in 2 months.     PLAN:  1.  The following issues were addressed during this visit:  A. Continue daily PPI for now (given occasional symptoms of GERD) and continue daily Bariatric vitamins/supplements indefinitely  B. Continue to follow Post-Bariatric Surgery diet  C. NO restrictions on lifting, strenuous activity, or physical exercise  D. Recommend 150 minutes of exercise per week - importance of resistance training was emphasized    2.  Postoperative labs will be ordered according to the following schedule (in 3, 6, and 12 months after surgery, then yearly):    CBC  CMP (including Liver panel)  Iron panel (including Ferritin/Transferrin)  Lipid panel (fasting)  Pre-albumin  Vit B1, B6, B9 (Folate), B12  Vit D,25  iPTH  DHEA  HgA1C (if diabetic only)  TSH, T4 (if history of thyroid disease)  Vit A, Vit E, Copper, Zinc (only if having difficulties with absorption)    3.  Ms. Behm has been instructed to return to our clinic in 2 months for her repeat postoperative office visit.    I advised the patient to call or return to our office ASAP if new symptoms or concerns arise in the near future.    Thank you for giving us the opportunity to care for your patient.      Sincerely,    Gavin Rogel MD MPH FACS [facility]  Bariatric and Gastroesophageal Surgery  Department of Surgery, [facility]  Clinical Assistant Professor of Surgery  [facility]    12/4/2024

## 2024-12-05 LAB — VIT B2 SERPL-SCNC: 16 NMOL/L (ref 5–50)

## 2025-02-01 ENCOUNTER — HOSPITAL ENCOUNTER (OUTPATIENT)
Dept: LAB | Facility: MEDICAL CENTER | Age: 50
End: 2025-02-01
Attending: SURGERY
Payer: COMMERCIAL

## 2025-02-01 DIAGNOSIS — E66.9 OBESITY (BMI 30-39.9): ICD-10-CM

## 2025-02-01 DIAGNOSIS — Z98.84 S/P LAPAROSCOPIC SLEEVE GASTRECTOMY: ICD-10-CM

## 2025-02-01 LAB
25(OH)D3 SERPL-MCNC: 41 NG/ML (ref 30–100)
ALBUMIN SERPL BCP-MCNC: 4.4 G/DL (ref 3.2–4.9)
ALBUMIN/GLOB SERPL: 1.6 G/DL
ALP SERPL-CCNC: 107 U/L (ref 30–99)
ALT SERPL-CCNC: 25 U/L (ref 2–50)
ANION GAP SERPL CALC-SCNC: 9 MMOL/L (ref 7–16)
AST SERPL-CCNC: 22 U/L (ref 12–45)
BILIRUB SERPL-MCNC: 0.4 MG/DL (ref 0.1–1.5)
BUN SERPL-MCNC: 14 MG/DL (ref 8–22)
CALCIUM ALBUM COR SERPL-MCNC: 9.5 MG/DL (ref 8.5–10.5)
CALCIUM SERPL-MCNC: 9.8 MG/DL (ref 8.5–10.5)
CHLORIDE SERPL-SCNC: 105 MMOL/L (ref 96–112)
CHOLEST SERPL-MCNC: 209 MG/DL (ref 100–199)
CO2 SERPL-SCNC: 28 MMOL/L (ref 20–33)
CREAT SERPL-MCNC: 0.81 MG/DL (ref 0.5–1.4)
ERYTHROCYTE [DISTWIDTH] IN BLOOD BY AUTOMATED COUNT: 40.4 FL (ref 35.9–50)
EST. AVERAGE GLUCOSE BLD GHB EST-MCNC: 103 MG/DL
FERRITIN SERPL-MCNC: 88.8 NG/ML (ref 10–291)
FOLATE SERPL-MCNC: 19 NG/ML
GFR SERPLBLD CREATININE-BSD FMLA CKD-EPI: 89 ML/MIN/1.73 M 2
GLOBULIN SER CALC-MCNC: 2.8 G/DL (ref 1.9–3.5)
GLUCOSE SERPL-MCNC: 86 MG/DL (ref 65–99)
HBA1C MFR BLD: 5.2 % (ref 4–5.6)
HCT VFR BLD AUTO: 41.9 % (ref 37–47)
HDLC SERPL-MCNC: 53 MG/DL
HGB BLD-MCNC: 14.1 G/DL (ref 12–16)
IRON SATN MFR SERPL: 34 % (ref 15–55)
IRON SERPL-MCNC: 89 UG/DL (ref 40–170)
LDLC SERPL CALC-MCNC: 137 MG/DL
MCH RBC QN AUTO: 29.9 PG (ref 27–33)
MCHC RBC AUTO-ENTMCNC: 33.7 G/DL (ref 32.2–35.5)
MCV RBC AUTO: 89 FL (ref 81.4–97.8)
PLATELET # BLD AUTO: 272 K/UL (ref 164–446)
PMV BLD AUTO: 9 FL (ref 9–12.9)
POTASSIUM SERPL-SCNC: 4.3 MMOL/L (ref 3.6–5.5)
PROT SERPL-MCNC: 7.2 G/DL (ref 6–8.2)
RBC # BLD AUTO: 4.71 M/UL (ref 4.2–5.4)
SODIUM SERPL-SCNC: 142 MMOL/L (ref 135–145)
TIBC SERPL-MCNC: 258 UG/DL (ref 250–450)
TRIGL SERPL-MCNC: 93 MG/DL (ref 0–149)
TSH SERPL-ACNC: 1.46 UIU/ML (ref 0.35–5.5)
UIBC SERPL-MCNC: 169 UG/DL (ref 110–370)
VIT B12 SERPL-MCNC: 966 PG/ML (ref 211–911)
WBC # BLD AUTO: 4.4 K/UL (ref 4.8–10.8)

## 2025-02-01 PROCEDURE — 82306 VITAMIN D 25 HYDROXY: CPT

## 2025-02-01 PROCEDURE — 80053 COMPREHEN METABOLIC PANEL: CPT

## 2025-02-01 PROCEDURE — 83036 HEMOGLOBIN GLYCOSYLATED A1C: CPT

## 2025-02-01 PROCEDURE — 83550 IRON BINDING TEST: CPT

## 2025-02-01 PROCEDURE — 84252 ASSAY OF VITAMIN B-2: CPT

## 2025-02-01 PROCEDURE — 84443 ASSAY THYROID STIM HORMONE: CPT

## 2025-02-01 PROCEDURE — 83540 ASSAY OF IRON: CPT

## 2025-02-01 PROCEDURE — 36415 COLL VENOUS BLD VENIPUNCTURE: CPT

## 2025-02-01 PROCEDURE — 85027 COMPLETE CBC AUTOMATED: CPT

## 2025-02-01 PROCEDURE — 84425 ASSAY OF VITAMIN B-1: CPT

## 2025-02-01 PROCEDURE — 82728 ASSAY OF FERRITIN: CPT

## 2025-02-01 PROCEDURE — 82607 VITAMIN B-12: CPT

## 2025-02-01 PROCEDURE — 82746 ASSAY OF FOLIC ACID SERUM: CPT

## 2025-02-01 PROCEDURE — 80061 LIPID PANEL: CPT

## 2025-02-05 ENCOUNTER — TELEMEDICINE (OUTPATIENT)
Dept: SURGICAL ONCOLOGY | Facility: MEDICAL CENTER | Age: 50
End: 2025-02-05
Payer: COMMERCIAL

## 2025-02-05 ENCOUNTER — HOSPITAL ENCOUNTER (OUTPATIENT)
Facility: MEDICAL CENTER | Age: 50
End: 2025-02-05
Attending: SURGERY
Payer: COMMERCIAL

## 2025-02-05 DIAGNOSIS — Z98.84 S/P LAPAROSCOPIC SLEEVE GASTRECTOMY: ICD-10-CM

## 2025-02-05 DIAGNOSIS — E66.01 MORBID OBESITY DUE TO EXCESS CALORIES (HCC): ICD-10-CM

## 2025-02-05 DIAGNOSIS — Z72.4 INAPPROPRIATE DIET AND EATING HABITS: ICD-10-CM

## 2025-02-05 DIAGNOSIS — E66.9 OBESITY (BMI 30-39.9): ICD-10-CM

## 2025-02-05 DIAGNOSIS — K21.9 GASTROESOPHAGEAL REFLUX DISEASE, UNSPECIFIED WHETHER ESOPHAGITIS PRESENT: ICD-10-CM

## 2025-02-05 LAB — VIT B1 BLD-MCNC: 146 NMOL/L (ref 70–180)

## 2025-02-05 PROCEDURE — 36415 COLL VENOUS BLD VENIPUNCTURE: CPT

## 2025-02-05 PROCEDURE — 84252 ASSAY OF VITAMIN B-2: CPT

## 2025-02-05 PROCEDURE — 99213 OFFICE O/P EST LOW 20 MIN: CPT | Mod: 95 | Performed by: SURGERY

## 2025-02-05 RX ORDER — PHENTERMINE HYDROCHLORIDE 15 MG/1
15 CAPSULE ORAL EVERY MORNING
Qty: 30 CAPSULE | Refills: 1 | Status: SHIPPED | OUTPATIENT
Start: 2025-02-05 | End: 2025-02-10

## 2025-02-05 RX ORDER — OMEPRAZOLE 20 MG/1
20 CAPSULE, DELAYED RELEASE ORAL DAILY
Qty: 90 CAPSULE | Refills: 1 | Status: SHIPPED | OUTPATIENT
Start: 2025-02-05

## 2025-02-05 NOTE — PROGRESS NOTES
DEPARTMENT OF SURGERY  BARIATRIC AND GASTROESOPHAGEAL SURGERY      DATE OF SERVICE:  12/4/2024    REASON FOR VISIT:  Follow Up Evaluation / Establishing car    This evaluation was conducted via Microsoft Teams using secure and encrypted videoconferencing technology. The patient was in her home in the [facility]. The patient's identity was confirmed and verbal consent was obtained for this virtual visit.    HISTORY OF PRESENT ILLNESS:  Ms. Behm is returning for a routine postoperative evaluation as she continues to recover ~ six months post robotic sleeve gastrectomy with hiatal hernia repair. She is experiencing the following symptoms: occasional regurgitation with mild dysphagia. Of note, these symptoms persist despite being on daily omeprazole, however, they remain sporadic and occasional. Otherwise, no recent history of fevers/chills, jaundice/icterus, odynophagia, hematemesis, bloating/distention, worsening abdominal pain, dysuria/hematuria, BRBPR/melena, or diarrhea. All abdominal incisions have healed well, without evidence of infection or bulges. Readmissions, interventions, or reoperations since primary operation: No.    The patient has lost 58 lbs since surgery.  The patient is meeting fluid requirements.   The patient is meeting protein requirements.  The patient is consuming bariatric vitamin/mineral supplements.  The patient is not using tobacco/nicotine.    Past Medical History:   Diagnosis Date    Heart burn     Hiatus hernia syndrome     High cholesterol     Hypertension     Migraines     Pneumonia     Psychiatric problem     post partum depression    Urinary incontinence     stress incontinence     Past Surgical History:   Procedure Laterality Date    [place] LAP GABRIEL RESTRICT PROC LONGITUDINAL GAS*  8/9/2024    Procedure: ROBOTIC SLEEVE GASTRECTOMY, HIATAL HERNIA REPAIR;  Surgeon: Gavin Rogel M.D.;  Location: SURGERY [facility];  Service: Gen Robotic    PELVISCOPY  06/28/2011     Performed by HORACIO REYNOLDS at SURGERY SAME DAY [facility]    LYSIS ADHESIONS GYN  06/28/2011    Performed by HORACIO REYNOLDS at SURGERY SAME DAY [facility]    GYN SURGERY  01/01/2010    ovaries removed    GYN SURGERY  01/01/2008    hysterectomy (uterus removed)    TONSILLECTOMY  1982    HYSTERECTOMY, TOTAL ABDOMINAL       No Known Allergies    Current Outpatient Medications   Medication Sig Dispense Refill    omeprazole (PRILOSEC) 20 MG delayed-release capsule Take 1 Capsule by mouth every day. 90 Capsule 1    phentermine 15 MG capsule Take 1 Capsule by mouth every morning for 60 days. 30 Capsule 1    ursodiol (ACTIGALL) 300 MG Cap Take 300 mg by mouth 2 times a day.      methocarbamol (ROBAXIN) 750 MG Tab Take 1 Tablet by mouth 3 times a day as needed (Abdominal or inguinal pain). 90 Tablet 1    acetaminophen (TYLENOL) 500 MG Tab Take 1,000 mg by mouth every 6 hours as needed for Moderate Pain.      ROPINIRole (REQUIP) 0.25 MG Tab Take 0.25 mg by mouth 3 times a day as needed (Restless leg).      losartan-hydrochlorothiazide (HYZAAR) 50-12.5 MG per tablet Take 1 Tablet by mouth every day.    (Patient not taking: Reported on 12/4/2024)       No current facility-administered medications for this visit.     PHYSICAL EXAM:    General: healthy, alert, oriented, no distress, relaxed, cooperative  Psych: good judgement, mood and affect are appropriate, excellent hygiene    ASSESSMENT:  Ms. Behm returns to our clinic today - via Telemedicine - for a six month postoperative visit after undergoing her robotic sleeve gastrectomy with hiatal hernia repair. She has done very well after surgery and has voiced approval of this procedure and its outcome thus far. We discussed the expected postoperative course and potential long-term complications of bariatric surgery (such as vitamin/nutrient deficiencies, heartburn/reflux, and inadequate weight loss/weight regain). In addition, the importance of following recommended dietary  changes and participating in regular exercise were highly emphasized. We will schedule the next appointment as part of our scheduled follow up series in 3-6 months.     Of note, patient is concerned that her weight loss has plateaued early (no significant weight loss over the last 1+ month), thus, will start her on Phentermine 15 mg daily (and titrate up as needed) in order to re-ignite her weight loss in the early postop period. Patient was counseled re: possible side-effects of this medications (e.g. palpitations, hypertension, tachycardia, etc) and wishes to proceed with this plan. In addition, will refer to our Pharmacotherapy service to consider initiation of injectable anti-obesity medication.     PLAN:  1.  The following issues were addressed during this visit:  A. Continue daily PPI for now (given occasional symptoms of GERD) and continue daily Bariatric vitamins/supplements indefinitely  B. Continue to follow Post-Bariatric Surgery diet  C. NO restrictions on lifting, strenuous activity, or physical exercise  D. Recommend 150 minutes of exercise per week - importance of resistance training was emphasized    2.  Postoperative labs will be ordered according to the following schedule (in 3, 6, and 12 months after surgery, then yearly):    CBC  CMP (including Liver panel)  Iron panel (including Ferritin/Transferrin)  Lipid panel (fasting)  Pre-albumin  Vit B1, B6, B9 (Folate), B12  Vit D,25  iPTH  DHEA  HgA1C (if diabetic only)  TSH, T4 (if history of thyroid disease)  Vit A, Vit E, Copper, Zinc (only if having difficulties with absorption)    3.  Ms. Behm has been instructed to return to our clinic in 3-6 months for her repeat postoperative office visit.    I advised the patient to call or return to our office ASAP if new symptoms or concerns arise in the near future.    Thank you for giving us the opportunity to care for your patient.      Sincerely,    Gavin Rogel MD MPH FACS [facility]  Bariatric  and Gastroesophageal Surgery  Department of Surgery, [facility]  Clinical Assistant Professor of Surgery  [employer]    12/4/2024

## 2025-02-07 ENCOUNTER — TELEPHONE (OUTPATIENT)
Dept: HEALTH INFORMATION MANAGEMENT | Facility: OTHER | Age: 50
End: 2025-02-07
Payer: COMMERCIAL

## 2025-02-07 ENCOUNTER — TELEPHONE (OUTPATIENT)
Dept: SURGICAL ONCOLOGY | Facility: MEDICAL CENTER | Age: 50
End: 2025-02-07
Payer: COMMERCIAL

## 2025-02-07 NOTE — TELEPHONE ENCOUNTER
Patient called regarding the pharmacy not getting the phentermine Rx.     It looks like the prescription was not sent to pharmacy but was printed instead.     Could you please reorder and send it to [facility] on file, thank you

## 2025-02-10 DIAGNOSIS — E66.9 OBESITY (BMI 30-39.9): ICD-10-CM

## 2025-02-10 RX ORDER — PHENTERMINE HYDROCHLORIDE 15 MG/1
15 CAPSULE ORAL EVERY MORNING
Qty: 60 CAPSULE | Refills: 0 | Status: SHIPPED | OUTPATIENT
Start: 2025-02-10 | End: 2025-04-11

## 2025-02-11 LAB — VIT B2 SERPL-SCNC: 21 NMOL/L (ref 5–50)

## 2025-02-12 ENCOUNTER — TELEPHONE (OUTPATIENT)
Dept: SURGICAL ONCOLOGY | Facility: MEDICAL CENTER | Age: 50
End: 2025-02-12
Payer: COMMERCIAL

## 2025-02-12 NOTE — TELEPHONE ENCOUNTER
Patient called stating that the [facility] in [facility] did not have phentermine available and they do not know when it will be in stock unfortunately. Patient is wondering if she can have the prescription sent to [facility] on [address] instead. Pharmacy has been updated in the chart.

## 2025-03-13 ENCOUNTER — NON-PROVIDER VISIT (OUTPATIENT)
Dept: VASCULAR LAB | Facility: MEDICAL CENTER | Age: 50
End: 2025-03-13
Attending: INTERNAL MEDICINE
Payer: COMMERCIAL

## 2025-03-13 VITALS — BODY MASS INDEX: 30.28 KG/M2 | WEIGHT: 188.4 LBS | HEIGHT: 66 IN

## 2025-03-13 DIAGNOSIS — E66.811 CLASS 1 OBESITY DUE TO EXCESS CALORIES WITHOUT SERIOUS COMORBIDITY WITH BODY MASS INDEX (BMI) OF 30.0 TO 30.9 IN ADULT: ICD-10-CM

## 2025-03-13 DIAGNOSIS — E66.09 CLASS 1 OBESITY DUE TO EXCESS CALORIES WITHOUT SERIOUS COMORBIDITY WITH BODY MASS INDEX (BMI) OF 30.0 TO 30.9 IN ADULT: ICD-10-CM

## 2025-03-13 PROCEDURE — 99213 OFFICE O/P EST LOW 20 MIN: CPT

## 2025-03-13 RX ORDER — SEMAGLUTIDE 0.25 MG/.5ML
0.25 INJECTION, SOLUTION SUBCUTANEOUS
Qty: 2 ML | Refills: 1 | Status: SHIPPED | OUTPATIENT
Start: 2025-03-13

## 2025-03-13 ASSESSMENT — FIBROSIS 4 INDEX: FIB4 SCORE: 0.78

## 2025-03-13 NOTE — PROGRESS NOTES
Patient Consult Note    Primary care physician: NGUYEN Hallman    Reason for consult: Obesity/Weight Management    Date Referral Placed: 02/05/25    HPI:  Marlana Marie Behm is a 49 y.o. old patient who comes in today for evaluation of above stated problem.    Initial Weight: 188.4 lbs  Initial BMI: 30.41 kg/m2    Current Weight: 188.4 lbs   Current BMI: 30.41 kg/m2    Pt is s/p gastrectomy 08/09/2024  Prior to bariatric surgery, pt weighed 245lbs.  Since surgery she has lost ~65lbs but has plateaued.  On 02/05/25, Bariatric Surgery Dr Rogel started pt on phentermine however pt has lost 1 lb since initiation of phentermine.  Therefore she is seen today to be started on GLP1RA therapy.    Most Recent HbA1c:   Lab Results   Component Value Date/Time    HBA1C 5.2 02/01/2025 07:39 AM        Most Recent TSH:   TSH   Date Value Ref Range Status   02/01/2025 1.460 0.350 - 5.500 uIU/mL Final       Most Recent SrCr and GFR:  Lab Results   Component Value Date/Time    CREATININE 0.81 02/01/2025 07:39 AM      GFR (CKD-EPI)   Date Value Ref Range Status   02/01/2025 89 >60 mL/min/1.73 m 2 Final     Comment:     Estimated Glomerular Filtration Rate is calculated using  race neutral CKD-EPI 2021 equation per NKF-ASN recommendations.         Current Obesity Medication Regimen  Metformin: none   GIP and/or GLP-1 Analog: none  For use as an adjunct to diet and exercise in patients with a BMI >=30 kg/m2, or in patients with a BMI >=27 kg/m2 and >=1 weight-associated comorbidity (eg, HTN, HLD, ARNIE, T2DM, etc).  Pt denies personal/family hx of medullary thyroid carcinoma or multiple endocrine neoplasia syndrome type 2 (MEN 2).  Naltrexone/bupropion (Contrave): none  Phentermine: 15mg daily     Previous Obesity Medication(s) and Reason for Discontinuation  none    Lifestyle  Physical Activity:  Current Exercise - Treadmill 2x/week 30min at a time; has a gym membership but last time she went was weeks ago  Exercise Goal -  "At least 150 min/week of anything aerobic.    Dietary:s/p bariatric surgery Aug; eats small portions  Breakfast - Fairlife 30gm protein shake  Lunch - shredded chicken, salsa and taco seasoning  Dinner - Steak with ~1 handful of vegetables  Snack - individual piece of [place] cheese  Dessert - none  Beverage - Tea with \"skinny\" syrup (sugar free); Protein 2.0 flavored water, water      Past Medical History:  Patient Active Problem List    Diagnosis Date Noted    BMI 30.0-30.9,adult 2025    Class 1 obesity due to excess calories without serious comorbidity with body mass index (BMI) of 30.0 to 30.9 in adult 2025       Past Surgical History:  Past Surgical History:   Procedure Laterality Date    [place] LAP GABRIEL RESTRICT PROC LONGITUDINAL GAS*  2024    Procedure: ROBOTIC SLEEVE GASTRECTOMY, HIATAL HERNIA REPAIR;  Surgeon: Gavin Rogel M.D.;  Location: SURGERY [facility];  Service: Gen Robotic    PELVISCOPY  2011    Performed by HORACIO REYNOLDS at SURGERY SAME DAY [facility]    LYSIS ADHESIONS GYN  2011    Performed by HORACIO REYNOLDS at SURGERY SAME DAY [facility]    GYN SURGERY  2010    ovaries removed    GYN SURGERY  2008    hysterectomy (uterus removed)    TONSILLECTOMY  1982    HYSTERECTOMY, TOTAL ABDOMINAL         Allergies:  Patient has no known allergies.    Social History:  Social History     Socioeconomic History    Marital status:      Spouse name: Not on file    Number of children: Not on file    Years of education: Not on file    Highest education level: Not on file   Occupational History    Not on file   Tobacco Use    Smoking status: Former     Current packs/day: 0.00     Average packs/day: 0.5 packs/day for 28.0 years (14.0 ttl pk-yrs)     Types: Cigarettes     Start date:      Quit date: 2018     Years since quittin.2    Smokeless tobacco: Never   Vaping Use    Vaping status: Never Used   Substance and Sexual Activity    Alcohol use: Not " Currently     Comment: occational    Drug use: Not Currently    Sexual activity: Not on file   Other Topics Concern    Not on file   Social History Narrative    ** Merged History Encounter **          Social Drivers of Health     Financial Resource Strain: Not on file   Food Insecurity: No Food Insecurity (8/10/2024)    Hunger Vital Sign     Worried About Running Out of Food in the Last Year: Never true     Ran Out of Food in the Last Year: Never true   Transportation Needs: No Transportation Needs (8/10/2024)    PRAPARE - Transportation     Lack of Transportation (Medical): No     Lack of Transportation (Non-Medical): No   Physical Activity: Not on file   Stress: Not on file   Social Connections: Not on file   Intimate Partner Violence: Not At Risk (8/9/2024)    Humiliation, Afraid, Rape, and Kick questionnaire     Fear of Current or Ex-Partner: No     Emotionally Abused: No     Physically Abused: No     Sexually Abused: No   Housing Stability: Low Risk  (8/10/2024)    Housing Stability Vital Sign     Unable to Pay for Housing in the Last Year: No     Number of Places Lived in the Last Year: 1     Unstable Housing in the Last Year: No       Family History:  Family History   Problem Relation Age of Onset    Breast Cancer Paternal Grandmother        Medications:    Current Outpatient Medications:     Semaglutide (WEGOVY) 0.25 MG/0.5ML Solution Auto-injector Pen-injector, Inject 0.5 mL under the skin every 7 days., Disp: 2 mL, Rfl: 1    phentermine 15 MG capsule, Take 1 Capsule by mouth every morning for 60 days., Disp: 60 Capsule, Rfl: 0    omeprazole (PRILOSEC) 20 MG delayed-release capsule, Take 1 Capsule by mouth every day., Disp: 90 Capsule, Rfl: 1    ursodiol (ACTIGALL) 300 MG Cap, Take 300 mg by mouth 2 times a day., Disp: , Rfl:     methocarbamol (ROBAXIN) 750 MG Tab, Take 1 Tablet by mouth 3 times a day as needed (Abdominal or inguinal pain)., Disp: 90 Tablet, Rfl: 1    acetaminophen (TYLENOL) 500 MG Tab,  "Take 1,000 mg by mouth every 6 hours as needed for Moderate Pain., Disp: , Rfl:     ROPINIRole (REQUIP) 0.25 MG Tab, Take 0.25 mg by mouth 3 times a day as needed (Restless leg)., Disp: , Rfl:     losartan-hydrochlorothiazide (HYZAAR) 50-12.5 MG per tablet, Take 1 Tablet by mouth every day.  (Patient not taking: Reported on 12/4/2024), Disp: , Rfl:     Physical Examination:  Vital signs: Ht 1.676 m (5' 6\")   Wt 85.5 kg (188 lb 6.4 oz)   BMI 30.41 kg/m²      Assessment and Plan:    1. Obesity/Weight Management  Pt is s/p gastrectomy 08/09/2024. Prior to bariatric surgery, pt weighed 245lbs.  Since surgery she has lost ~65lbs but has plateaued, therefore on 02/05/25, Bariatric Surgery Dr Rogel started pt on phentermine however pt has lost 1 lb since initiation of phentermine.  BMI is still above 30 kg/m2, therefore pt will benefit with further weight management from GLP1RA     - Medication changes:  START Wegovy 0.25mg q 7 days  Sent to [facility] and CC'd [facility] to help with financial auth  Pt to STOP phentermine if she is able to start GLP1RA   Counseled pt on how to mitigate ADRs from GLP1RA  - Continue:  N/a     - Lifestyle changes:  Diet: Monitor caloric intake - aim for deficit. Maximize lean proteins and veggies. Cut out/down on carbs. Avoid simple sugars.   Referral to Nutrition Services placed: No  - pt has had extensive nutrition counseling in preparation for bariatric surgery.  Exercise: Increase as tolerated. Goal of at least 150 min/week of anything aerobic.    Follow Up:  Return depending if Wegovy is covered. If covered, will schedule pt back in 4 weeks.  Will f/u with pt via phone/MCM on auth status    Maddie Lynn, PharmD    CC:   RXC    "

## 2025-03-21 ENCOUNTER — TELEPHONE (OUTPATIENT)
Dept: VASCULAR LAB | Facility: MEDICAL CENTER | Age: 50
End: 2025-03-21
Payer: COMMERCIAL

## 2025-03-21 NOTE — TELEPHONE ENCOUNTER
"Received New Start request via [employer]  for Semaglutide (WEGOVY) 0.25 MG/0.5ML Solution Auto-injector Pen-injector. (Quantity:2 mls, Day Supply:28)     Insurance: RX BCBS   Member ID:  [id number]  BIN: [id number]  PCN: [id number]  Group: [id number]     Ran Test claim via [place] & medication  rejects Prior authorization required. Per insurance, \"Product Not Covered for Weight loss\".   Form Alt: QSYMIA       CAP 3.75-23        Form Alt: CONTRAVE     TAB 8-90MG         Form Alt: PHENTERMINE  CAP 15MG           Form Alt: QSYMIA       CAP 3.75-23        Form Alt: CONTRAVE     TAB 8-90MG         At this time, we are unable to pursue Prior authorization submission to insurance as medication is excluded under the insurance.   Will notify provider/clinic staff for the status.    JAMEE Ponce, PhT  Vascular Pharmacy Liaison (Rx Coordinator)  P: [phone]  3/21/2025 4:29 PM          "

## 2025-03-31 NOTE — TELEPHONE ENCOUNTER
S/w pt today in regards to the information obtained from insurance that Wegovy is excluded by the plan. Pt confirmed that she is on Phentermine currently, and haven't tried the other alternatives that her insurance recommended for her (Qsymia, Contrave).     At this time, I explained to pt that [facility] pharmacy does offer discount copay programs for Wegovy, but the cost is high. Offered her Ozempic at this point for the specialty pricing cost, and pt would like to discuss this option to her  before she moves forward with it.     Provided my contact information if she already made a final decision to move forward or not.     Notified and updated provider for the status.     JAMEE Ponce, PhT  Vascular Pharmacy Liaison (Rx Coordinator)  P: [phone]  3/31/2025 2:07 PM

## 2025-04-07 DIAGNOSIS — E66.9 OBESITY (BMI 30-39.9): ICD-10-CM

## 2025-04-07 NOTE — TELEPHONE ENCOUNTER
Wegovy is not covered by insurance. Pt unable to afford specialty pricing of Ozempic.  Will d/c from pharmacotherapy.  Maddie Lynn, PharmD

## 2025-04-08 RX ORDER — PHENTERMINE HYDROCHLORIDE 15 MG/1
15 CAPSULE ORAL EVERY MORNING
Qty: 60 CAPSULE | Refills: 0 | Status: SHIPPED | OUTPATIENT
Start: 2025-04-08 | End: 2025-06-07

## 2025-07-07 ENCOUNTER — APPOINTMENT (OUTPATIENT)
Dept: RADIOLOGY | Facility: IMAGING CENTER | Age: 50
End: 2025-07-07
Payer: COMMERCIAL

## 2025-07-07 ENCOUNTER — OFFICE VISIT (OUTPATIENT)
Dept: URGENT CARE | Facility: PHYSICIAN GROUP | Age: 50
End: 2025-07-07
Payer: COMMERCIAL

## 2025-07-07 VITALS
RESPIRATION RATE: 12 BRPM | HEART RATE: 75 BPM | HEIGHT: 66 IN | DIASTOLIC BLOOD PRESSURE: 86 MMHG | SYSTOLIC BLOOD PRESSURE: 130 MMHG | OXYGEN SATURATION: 99 % | BODY MASS INDEX: 30.22 KG/M2 | TEMPERATURE: 98 F | WEIGHT: 188 LBS

## 2025-07-07 DIAGNOSIS — R07.81 RIB PAIN: ICD-10-CM

## 2025-07-07 DIAGNOSIS — S29.011A MUSCLE STRAIN OF CHEST WALL, INITIAL ENCOUNTER: Primary | ICD-10-CM

## 2025-07-07 PROCEDURE — 3079F DIAST BP 80-89 MM HG: CPT

## 2025-07-07 PROCEDURE — 3075F SYST BP GE 130 - 139MM HG: CPT

## 2025-07-07 PROCEDURE — 99213 OFFICE O/P EST LOW 20 MIN: CPT

## 2025-07-07 PROCEDURE — 71101 X-RAY EXAM UNILAT RIBS/CHEST: CPT | Mod: TC,RT | Performed by: RADIOLOGY

## 2025-07-07 RX ORDER — SUMATRIPTAN 50 MG/1
50 TABLET, FILM COATED ORAL
COMMUNITY
Start: 2025-06-30

## 2025-07-07 ASSESSMENT — FIBROSIS 4 INDEX: FIB4 SCORE: 0.78

## 2025-07-07 ASSESSMENT — ENCOUNTER SYMPTOMS
PALPITATIONS: 0
CHILLS: 0
WEAKNESS: 0
SENSORY CHANGE: 0
FLANK PAIN: 0
FEVER: 0
FALLS: 0
FOCAL WEAKNESS: 0
ABDOMINAL PAIN: 0
DIZZINESS: 0
VOMITING: 0
HEMOPTYSIS: 0

## 2025-07-08 NOTE — PROGRESS NOTES
"Subjective     Marlana Marie Behm is a 49 y.o. female who presents with Pain (Under R rib cage,Friday night stretched too far and heard a pop )    4 days of right lower rib discomfort. Recalls she leaned over, overstretched and felt a pop. Denies weakness, numbness or tingling of extremities. No SOB though has discomfort with deep breathing.  Concerned for possible rib fracture.  No other aggravating or alleviating factors.        Review of Systems   Constitutional:  Negative for chills and fever.   Respiratory:  Negative for hemoptysis.    Cardiovascular:  Negative for chest pain and palpitations.   Gastrointestinal:  Negative for abdominal pain and vomiting.   Genitourinary:  Negative for dysuria, flank pain, frequency, hematuria and urgency.   Musculoskeletal:  Negative for falls.   Neurological:  Negative for dizziness, sensory change, focal weakness and weakness.   All other systems reviewed and are negative.    Medications:    acetaminophen Tabs  losartan-hydrochlorothiazide  omeprazole  ROPINIRole Tabs  SUMAtriptan Tabs  ursodiol Caps    Allergies:  Patient has no known allergies.    Past Social Hx:  Marlana Marie Behm  reports that she quit smoking about 7 years ago. Her smoking use included cigarettes. She started smoking about 35 years ago. She has a 14 pack-year smoking history. She has never used smokeless tobacco. She reports that she does not currently use alcohol. She reports that she does not currently use drugs.    Past Medical Hx: Past Medical History[1]            Objective     /86 (BP Location: Right arm, Patient Position: Sitting, BP Cuff Size: Adult)   Pulse 75   Temp 36.7 °C (98 °F) (Temporal)   Resp 12   Ht 1.676 m (5' 6\")   Wt 85.3 kg (188 lb)   SpO2 99%   BMI 30.34 kg/m²      Physical Exam  Vitals reviewed.   Constitutional:       General: She is not in acute distress.     Appearance: She is not ill-appearing.   HENT:      Head: Atraumatic.   Cardiovascular:      Rate and " Rhythm: Normal rate.      Heart sounds: Normal heart sounds.   Pulmonary:      Effort: Pulmonary effort is normal. No respiratory distress.      Breath sounds: Normal breath sounds. No wheezing, rhonchi or rales.   Chest:      Chest wall: No deformity.       Musculoskeletal:         General: Normal range of motion.      Cervical back: Normal range of motion and neck supple.   Skin:     General: Skin is warm and dry.      Capillary Refill: Capillary refill takes less than 2 seconds.   Neurological:      Mental Status: She is alert and oriented to person, place, and time.   Psychiatric:         Behavior: Behavior normal.                Assessment & Plan  Muscle strain of chest wall, initial encounter         Rib pain    Orders:    [id number] (WITH 1-VIEW CXR) RIGHT; Future    [id number] (WITH 1-VIEW CXR) RIGHT  Result Date: 7/7/2025 7/7/2025 6:09 PM HISTORY/REASON FOR EXAM:  Right anterior rib pain. TECHNIQUE/EXAM DESCRIPTION AND NUMBER OF VIEWS:  5 images of the right ribs and chest. COMPARISON: 04/06/2024 FINDINGS: No fractures or acute bony changes are noted.  There is no evidence of a hemo or pneumothorax.     Normal rib series.  --  Discussed x-ray findings during patient visit. Suspect chest wall strain.  Well-appearing and vital signs are stable. Recommend splinting with deep breathing exercises, prn cool compress/warm packs. Attests she already has lidocaine patches at home, may continue to use or Tylenol per the manufactures instructions unless contraindicated.  Strict ER precautions advised.    Differential diagnosis, natural history, supportive care, management options, risks/benefits, and alternatives to treatment discussed. Questions were encouraged and answered. Pt/parent/guardian verbalized understanding and the treatment plan was agreed upon. Advised to follow-up as needed with PCP or RTC for recheck, reevaluation, and consideration of further management. Red flags and indications for  immediate care discussed. Advised to seek higher level of care through the Emergency Department for any new or worsening symptoms.     This note was electronically signed by   Nereida Boyd DNP, APRN, KENP-BC         [1]   Past Medical History:  Diagnosis Date    Heart burn     Hiatus hernia syndrome     High cholesterol     Hypertension     Migraines     Pneumonia     Psychiatric problem     post partum depression    Urinary incontinence     stress incontinence

## (undated) DEVICE — SODIUM CHL IRRIGATION 0.9% 1000ML (12EA/CA)

## (undated) DEVICE — OBTURATOR BLADELESS STANDARD 8MM (6EA/BX)

## (undated) DEVICE — COVER LIGHT HANDLE ALC PLUS DISP (18EA/BX)

## (undated) DEVICE — DERMABOND ADVANCED - (12EA/BX)

## (undated) DEVICE — CHLORAPREP 26 ML APPLICATOR - ORANGE TINT(25/CA)

## (undated) DEVICE — TROCAR 5X100 NON BLADED Z-TH - READ KII (6/BX)

## (undated) DEVICE — GLOVE BIOGEL SZ 8 SURGICAL PF LTX - (50PR/BX 4BX/CA)

## (undated) DEVICE — SEALER VESSEL EXTEND FROM DAVINCI ENERGY (6EA/BX)

## (undated) DEVICE — AIRLESS LAP SPRAY TIP VISTASEAL (3EA/BX)

## (undated) DEVICE — COVER TIP ENDOWRIST HOT SHEAR - (10EA/BX) DA VINCI

## (undated) DEVICE — SUCTION INSTRUMENT YANKAUER BULBOUS TIP W/O VENT (50EA/CA)

## (undated) DEVICE — SEALANT TISSUE CLOSURE KIT FIBIRIN VISTASEAL 10ML (1EA/BX)

## (undated) DEVICE — SLEEVE VASO CALF MED - (10PR/CA)

## (undated) DEVICE — SUTURE GENERAL

## (undated) DEVICE — DRAPE ARM BOX OF 20

## (undated) DEVICE — LACTATED RINGERS INJ 1000 ML - (14EA/CA 60CA/PF)

## (undated) DEVICE — GOWN WARMING STANDARD FLEX - (30/CA)

## (undated) DEVICE — SUTURE 4-0 MONOCRYL PLUS PS-1 - 27 INCH (36/BX)

## (undated) DEVICE — SYSTEM CLEARIFY VISUALIZATION (10EA/PK)

## (undated) DEVICE — SET LEADWIRE 5 LEAD BEDSIDE DISPOSABLE ECG (1SET OF 5/EA)

## (undated) DEVICE — SENSOR OXIMETER ADULT SPO2 RD SET (20EA/BX)

## (undated) DEVICE — SUTURE 1 STRATAFIX SYMMETRIC PDS PLUS CT-1 45CM (12EA/BX)

## (undated) DEVICE — BLADE SURGICAL #15 - (50/BX 3BX/CA)

## (undated) DEVICE — SYRINGE 10 ML CONTROL LL (25EA/BX 4BX/CA)

## (undated) DEVICE — TUBE E-T HI-LO CUFF 7.0MM (10EA/PK)

## (undated) DEVICE — SUTURE 3-0 STRATAFIX SPIRAL PDS PLUS SH 15CM (12EA/BX)

## (undated) DEVICE — RELOAD STAPLER SUREFORM 60 BLUE (12EA/BX)

## (undated) DEVICE — RELOAD STAPLER SUREFORM 60 WHITE (12EA/BX)

## (undated) DEVICE — BAG RETRIEVAL 10ML (10EA/BX)

## (undated) DEVICE — ELECTRODE DUAL RETURN W/ CORD - (50/PK)

## (undated) DEVICE — SUTURE 2-0 ETHIBOND CT-2 (12PK/BX)

## (undated) DEVICE — CANISTER SUCTION 3000ML MECHANICAL FILTER AUTO SHUTOFF MEDI-VAC NONSTERILE LF DISP (40EA/CA)

## (undated) DEVICE — DRAPE COLUMN BOX OF 20

## (undated) DEVICE — SET EXTENSION WITH 2 PORTS (48EA/CA) ***PART #2C8610 IS A SUBSTITUTE*****

## (undated) DEVICE — SUTURE 0 VICRYL PLUS UR-6 - 27 INCH (36/BX)

## (undated) DEVICE — SUTURE 2-0 VICRYL PLUS SH - 27 INCH (36/BX)

## (undated) DEVICE — SOLUTION PREP PVP IODINE 3/4 OZ POUCH PACKET CONTAINER STERILE LATEX FREE

## (undated) DEVICE — TUBING CLEARLINK DUO-VENT - C-FLO (48EA/CA)

## (undated) DEVICE — Device

## (undated) DEVICE — PAD OR TABLE DA VINCI 2IN X 20IN X 72IN - (12EA/CA)

## (undated) DEVICE — ARMREST CRADLE FOAM - (2PR/PK 12PR/CA)

## (undated) DEVICE — TUBE NG SALEM SUMP 16FR (50EA/CA)

## (undated) DEVICE — STAPLER SUREFORM 60 12 MM (6EA/BX)